# Patient Record
Sex: FEMALE | Race: WHITE | Employment: FULL TIME | URBAN - METROPOLITAN AREA
[De-identification: names, ages, dates, MRNs, and addresses within clinical notes are randomized per-mention and may not be internally consistent; named-entity substitution may affect disease eponyms.]

---

## 2018-09-24 PROBLEM — J30.2 SEASONAL ALLERGIES: Status: ACTIVE | Noted: 2018-09-24

## 2018-09-24 PROBLEM — F41.9 ANXIETY: Status: ACTIVE | Noted: 2018-09-24

## 2018-10-11 ENCOUNTER — LAB ENCOUNTER (OUTPATIENT)
Dept: LAB | Facility: REFERENCE LAB | Age: 28
End: 2018-10-11
Attending: OBSTETRICS & GYNECOLOGY
Payer: COMMERCIAL

## 2018-10-11 ENCOUNTER — OFFICE VISIT (OUTPATIENT)
Dept: OBGYN CLINIC | Facility: CLINIC | Age: 28
End: 2018-10-11
Payer: COMMERCIAL

## 2018-10-11 VITALS
DIASTOLIC BLOOD PRESSURE: 70 MMHG | BODY MASS INDEX: 30.4 KG/M2 | WEIGHT: 196 LBS | SYSTOLIC BLOOD PRESSURE: 112 MMHG | HEIGHT: 67.32 IN

## 2018-10-11 DIAGNOSIS — Z32.01 POSITIVE URINE PREGNANCY TEST: ICD-10-CM

## 2018-10-11 DIAGNOSIS — Z32.01 POSITIVE URINE PREGNANCY TEST: Primary | ICD-10-CM

## 2018-10-11 PROBLEM — Z90.49 HX LAPAROSCOPIC CHOLECYSTECTOMY: Status: ACTIVE | Noted: 2018-10-11

## 2018-10-11 PROBLEM — L50.3 DERMATOGRAPHIC URTICARIA: Status: ACTIVE | Noted: 2018-10-11

## 2018-10-11 PROCEDURE — 81025 URINE PREGNANCY TEST: CPT | Performed by: OBSTETRICS & GYNECOLOGY

## 2018-10-11 PROCEDURE — 87491 CHLMYD TRACH DNA AMP PROBE: CPT | Performed by: OBSTETRICS & GYNECOLOGY

## 2018-10-11 PROCEDURE — 84702 CHORIONIC GONADOTROPIN TEST: CPT | Performed by: OBSTETRICS & GYNECOLOGY

## 2018-10-11 PROCEDURE — 86780 TREPONEMA PALLIDUM: CPT | Performed by: OBSTETRICS & GYNECOLOGY

## 2018-10-11 PROCEDURE — 86850 RBC ANTIBODY SCREEN: CPT | Performed by: OBSTETRICS & GYNECOLOGY

## 2018-10-11 PROCEDURE — 87389 HIV-1 AG W/HIV-1&-2 AB AG IA: CPT | Performed by: OBSTETRICS & GYNECOLOGY

## 2018-10-11 PROCEDURE — 84144 ASSAY OF PROGESTERONE: CPT | Performed by: OBSTETRICS & GYNECOLOGY

## 2018-10-11 PROCEDURE — 87591 N.GONORRHOEAE DNA AMP PROB: CPT | Performed by: OBSTETRICS & GYNECOLOGY

## 2018-10-11 PROCEDURE — 86900 BLOOD TYPING SEROLOGIC ABO: CPT | Performed by: OBSTETRICS & GYNECOLOGY

## 2018-10-11 PROCEDURE — 99203 OFFICE O/P NEW LOW 30 MIN: CPT | Performed by: OBSTETRICS & GYNECOLOGY

## 2018-10-11 PROCEDURE — 85025 COMPLETE CBC W/AUTO DIFF WBC: CPT | Performed by: OBSTETRICS & GYNECOLOGY

## 2018-10-11 PROCEDURE — 36415 COLL VENOUS BLD VENIPUNCTURE: CPT | Performed by: OBSTETRICS & GYNECOLOGY

## 2018-10-11 PROCEDURE — 86901 BLOOD TYPING SEROLOGIC RH(D): CPT | Performed by: OBSTETRICS & GYNECOLOGY

## 2018-10-11 PROCEDURE — 87340 HEPATITIS B SURFACE AG IA: CPT | Performed by: OBSTETRICS & GYNECOLOGY

## 2018-10-11 PROCEDURE — 87086 URINE CULTURE/COLONY COUNT: CPT | Performed by: OBSTETRICS & GYNECOLOGY

## 2018-10-11 PROCEDURE — 86762 RUBELLA ANTIBODY: CPT | Performed by: OBSTETRICS & GYNECOLOGY

## 2018-10-11 NOTE — PROGRESS NOTES
Fe Page here for a checkup. This is her first visit to our office. She is 44-year-old [place] female  1 para 0. Last menstrual period was 2018. Her pregnancy test x2 at home were positive. Pregnancy test here is positive. .  Her lety normal in size. Shape: Normal.  Adnexa: No masses or tenderness. Cul de sac: Normal.  R/V: Not performed. No hemorrhoids. ASSESSMENT/PLAN:   Assessment     1. Positive urine pregnancy test         ASSESSMENT:      Early intrauterine pregnancy.

## 2018-10-12 ENCOUNTER — TELEPHONE (OUTPATIENT)
Dept: OBGYN CLINIC | Facility: CLINIC | Age: 28
End: 2018-10-12

## 2018-10-12 NOTE — TELEPHONE ENCOUNTER
Regarding: Test Results Question  Contact: [phone]  ----- Message from Generic, Mychart sent at 10/11/2018 10:57 PM CDT -----    Richard Keating. I just saw my blood test result.  Regarding progesterone levels, should I start taking progestorone medica

## 2018-10-24 ENCOUNTER — APPOINTMENT (OUTPATIENT)
Dept: LAB | Facility: REFERENCE LAB | Age: 28
End: 2018-10-24
Attending: OBSTETRICS & GYNECOLOGY
Payer: COMMERCIAL

## 2018-10-24 ENCOUNTER — OFFICE VISIT (OUTPATIENT)
Dept: OBGYN CLINIC | Facility: CLINIC | Age: 28
End: 2018-10-24
Payer: COMMERCIAL

## 2018-10-24 VITALS — WEIGHT: 194 LBS | BODY MASS INDEX: 30 KG/M2 | DIASTOLIC BLOOD PRESSURE: 66 MMHG | SYSTOLIC BLOOD PRESSURE: 112 MMHG

## 2018-10-24 DIAGNOSIS — Z32.01 POSITIVE BLOOD PREGNANCY TEST: Primary | ICD-10-CM

## 2018-10-24 DIAGNOSIS — Z32.01 POSITIVE BLOOD PREGNANCY TEST: ICD-10-CM

## 2018-10-24 PROBLEM — R87.612 PAPANICOLAOU SMEAR OF CERVIX WITH LOW GRADE SQUAMOUS INTRAEPITHELIAL LESION (LGSIL): Status: ACTIVE | Noted: 2018-10-24

## 2018-10-24 PROCEDURE — 84702 CHORIONIC GONADOTROPIN TEST: CPT | Performed by: OBSTETRICS & GYNECOLOGY

## 2018-10-24 PROCEDURE — 84144 ASSAY OF PROGESTERONE: CPT | Performed by: OBSTETRICS & GYNECOLOGY

## 2018-10-24 PROCEDURE — 36415 COLL VENOUS BLD VENIPUNCTURE: CPT | Performed by: OBSTETRICS & GYNECOLOGY

## 2018-10-24 RX ORDER — ASCORBIC ACID, CHOLECALCIFEROL, .ALPHA.-TOCOPHEROL ACETATE, DL-, PYRIDOXINE HYDROCHLORIDE, FOLIC ACID, CYANOCOBALAMIN, BIOTIN, CALCIUM CARBONATE, FERROUS ASPARTO GLYCINATE, IRON, POTASSIUM IODIDE, MAGNESIUM OXIDE, DOCONEXENT AND LOWBUSH BLUEBERRY 60; 1000; 10; 26; 400; 13; 280; 80; 9; 9; 150; 25; 350; 25; 600 MG/1; [IU]/1; [IU]/1; MG/1; UG/1; UG/1; UG/1; MG/1; MG/1; MG/1; UG/1; MG/1; MG/1; MG/1; UG/1
1 CAPSULE, GELATIN COATED ORAL
Refills: 4 | COMMUNITY
Start: 2018-10-12 | End: 2019-09-25

## 2018-10-24 NOTE — PROGRESS NOTES
Greta Balbuena is here for a checkup. She is accompanied by her . She just came back from  trip. She is complaining of slight nausea and minimal headaches. Her quantitative beta-hCG at last visit was just in 400 range.   Plan is to repeat her qu

## 2018-10-25 ENCOUNTER — TELEPHONE (OUTPATIENT)
Dept: OBGYN CLINIC | Facility: CLINIC | Age: 28
End: 2018-10-25

## 2018-10-25 ENCOUNTER — PATIENT MESSAGE (OUTPATIENT)
Dept: OBGYN CLINIC | Facility: CLINIC | Age: 28
End: 2018-10-25

## 2018-10-25 NOTE — TELEPHONE ENCOUNTER
US orders placed per Dr. Richelle Recio conversation with pt today and Princess Serrano will schedule US and appmnt with Dr. Marija Ponce in 1-2 weeks

## 2018-10-25 NOTE — TELEPHONE ENCOUNTER
I called Lenoard Leyden to let her know that her quantitative beta-hCG as well as serum progesterone was normal.  I recommended that she schedules an ultrasound at our office within a week or 2 and sees me following the ultrasound.

## 2018-10-25 NOTE — TELEPHONE ENCOUNTER
From: Tasneem Mondragon  To: Jenni Miller MD  Sent: 10/25/2018 12:29 PM CDT  Subject: Visit [facility] Dr Inez Hernadez,    I called another clinic [facility] and [address] [place] [address] Drive.  [address] [place] [address]

## 2018-10-25 NOTE — TELEPHONE ENCOUNTER
Per Jillian Citlali, pt unable to come to OP for US and scheduled it at [facility] hospital

## 2018-11-01 ENCOUNTER — HOSPITAL ENCOUNTER (OUTPATIENT)
Dept: ULTRASOUND IMAGING | Age: 28
Discharge: HOME | End: 2018-11-01
Attending: OBSTETRICS & GYNECOLOGY
Payer: COMMERCIAL

## 2018-11-01 ENCOUNTER — TELEPHONE (OUTPATIENT)
Dept: OBGYN CLINIC | Facility: CLINIC | Age: 28
End: 2018-11-01

## 2018-11-01 ENCOUNTER — PATIENT MESSAGE (OUTPATIENT)
Dept: OBGYN CLINIC | Facility: CLINIC | Age: 28
End: 2018-11-01

## 2018-11-01 DIAGNOSIS — Z32.01 ENCOUNTER FOR PREGNANCY TEST, RESULT POSITIVE: ICD-10-CM

## 2018-11-01 PROCEDURE — 76801 OB US < 14 WKS SINGLE FETUS: CPT | Performed by: OBSTETRICS & GYNECOLOGY

## 2018-11-01 PROCEDURE — 76817 TRANSVAGINAL US OBSTETRIC: CPT | Performed by: OBSTETRICS & GYNECOLOGY

## 2018-11-01 RX ORDER — ONDANSETRON 4 MG/1
4 TABLET, FILM COATED ORAL EVERY 8 HOURS PRN
Qty: 20 TABLET | Refills: 0 | Status: SHIPPED | OUTPATIENT
Start: 2018-11-01 | End: 2018-11-07

## 2018-11-01 NOTE — TELEPHONE ENCOUNTER
Called pt r/t to [facility]. Pt states she has tried smaller meals and crackers but is still vomiting about 4 times/day. Pt has tried ginger ale as well. Advised pt to stay hydrated by taking small sips of water but frequently about q 15 min.  Informed pt

## 2018-11-01 NOTE — TELEPHONE ENCOUNTER
From: Alhaji Tovar  To: Mercy Wing MD  Sent: 11/1/2018 7:54 AM CDT  Subject: Visit [facility] Dr Samuel Georgis,    I have been vomiting after each meal since last Friday approximately.  I had morning (all day sickness) . having nause

## 2018-11-05 ENCOUNTER — TELEPHONE (OUTPATIENT)
Dept: OBGYN CLINIC | Facility: CLINIC | Age: 28
End: 2018-11-05

## 2018-11-05 NOTE — TELEPHONE ENCOUNTER
Per pt, she states that she has not had a BM for about 5 days. She has been walking and drinking plenty of fluids throughout the day. Encouraged pt to try MOM and to let us know tomorrow if she still has not had a BM.  Pt is also inquiring about her progest

## 2018-11-06 ENCOUNTER — TELEPHONE (OUTPATIENT)
Dept: OBGYN CLINIC | Facility: CLINIC | Age: 28
End: 2018-11-06

## 2018-11-06 NOTE — TELEPHONE ENCOUNTER
Spoke to pt and she stated that she had a BM yesterday and feels much better. Encouraged her to continue increasing her fluids and will discuss other options with Dr. Bing Munguia and f/u with pt.     Per Dr. Bing Munguia, pt can take Senekot, 1 tab nightly and to in

## 2018-11-07 RX ORDER — ONDANSETRON 4 MG/1
4 TABLET, FILM COATED ORAL EVERY 8 HOURS PRN
Qty: 20 TABLET | Refills: 0 | Status: SHIPPED | OUTPATIENT
Start: 2018-11-07 | End: 2019-04-11

## 2018-11-14 ENCOUNTER — OFFICE VISIT (OUTPATIENT)
Dept: OBGYN CLINIC | Facility: CLINIC | Age: 28
End: 2018-11-14
Payer: COMMERCIAL

## 2018-11-14 VITALS — DIASTOLIC BLOOD PRESSURE: 64 MMHG | WEIGHT: 192 LBS | SYSTOLIC BLOOD PRESSURE: 112 MMHG | BODY MASS INDEX: 30 KG/M2

## 2018-11-14 DIAGNOSIS — Z32.01 PREGNANCY EXAMINATION OR TEST, POSITIVE RESULT: Primary | ICD-10-CM

## 2018-11-14 NOTE — PROGRESS NOTES
Richi Carlson here for a checkup. By her last menstrual period and ultrasound she is about 9 weeks and 5 days. Plan on her is to get ultrasound in our office in 2 weeks and see her following the ultrasound.   I discussed her options and getting prelude genetic scr

## 2018-12-03 ENCOUNTER — TELEPHONE (OUTPATIENT)
Dept: OBGYN CLINIC | Facility: CLINIC | Age: 28
End: 2018-12-03

## 2018-12-03 NOTE — TELEPHONE ENCOUNTER
Call rec'd from [facility] re: 90 day supply of Progesterone. Informed pharmacist that pt will likely only require the script for 2 more weeks. Pharmacy will disregard.

## 2018-12-04 DIAGNOSIS — Z32.01 PREGNANCY EXAMINATION OR TEST, POSITIVE RESULT: Primary | ICD-10-CM

## 2018-12-05 ENCOUNTER — APPOINTMENT (OUTPATIENT)
Dept: LAB | Facility: REFERENCE LAB | Age: 28
End: 2018-12-05
Attending: OBSTETRICS & GYNECOLOGY
Payer: COMMERCIAL

## 2018-12-05 ENCOUNTER — ULTRASOUND ENCOUNTER (OUTPATIENT)
Dept: OBGYN CLINIC | Facility: CLINIC | Age: 28
End: 2018-12-05
Payer: COMMERCIAL

## 2018-12-05 ENCOUNTER — ROUTINE PRENATAL (OUTPATIENT)
Dept: OBGYN CLINIC | Facility: CLINIC | Age: 28
End: 2018-12-05
Payer: COMMERCIAL

## 2018-12-05 VITALS — WEIGHT: 194 LBS | SYSTOLIC BLOOD PRESSURE: 110 MMHG | BODY MASS INDEX: 30 KG/M2 | DIASTOLIC BLOOD PRESSURE: 62 MMHG

## 2018-12-05 DIAGNOSIS — Z32.01 PREGNANCY EXAMINATION OR TEST, POSITIVE RESULT: ICD-10-CM

## 2018-12-05 DIAGNOSIS — Z34.90 ENCOUNTER FOR SUPERVISION OF NORMAL PREGNANCY, ANTEPARTUM, UNSPECIFIED GRAVIDITY: ICD-10-CM

## 2018-12-05 DIAGNOSIS — Z34.90 ENCOUNTER FOR SUPERVISION OF NORMAL PREGNANCY, ANTEPARTUM, UNSPECIFIED GRAVIDITY: Primary | ICD-10-CM

## 2018-12-05 PROCEDURE — 76801 OB US < 14 WKS SINGLE FETUS: CPT | Performed by: OBSTETRICS & GYNECOLOGY

## 2018-12-05 PROCEDURE — 82947 ASSAY GLUCOSE BLOOD QUANT: CPT

## 2018-12-05 PROCEDURE — 36415 COLL VENOUS BLD VENIPUNCTURE: CPT

## 2018-12-05 PROCEDURE — 83036 HEMOGLOBIN GLYCOSYLATED A1C: CPT | Performed by: OBSTETRICS & GYNECOLOGY

## 2018-12-06 NOTE — PROGRESS NOTES
Jeramy Manjarrez is here for a checkup. Her ultrasound today shows live intrauterine gestation at 12 weeks and 4 days. Patient says that for past couple weeks she has been feeling extremely thirsty. There is no history of diabetes in the family.   I am going to get

## 2018-12-12 ENCOUNTER — PATIENT MESSAGE (OUTPATIENT)
Dept: OBGYN CLINIC | Facility: CLINIC | Age: 28
End: 2018-12-12

## 2018-12-12 ENCOUNTER — TELEPHONE (OUTPATIENT)
Dept: OBGYN CLINIC | Facility: CLINIC | Age: 28
End: 2018-12-12

## 2018-12-12 NOTE — TELEPHONE ENCOUNTER
I called Lita Kindra let her know that her prelude genetic screen was negative for trisomy 24, 25, 15. Patient wanted to know the gender of the fetus. Fetus is male.

## 2018-12-19 ENCOUNTER — PATIENT MESSAGE (OUTPATIENT)
Dept: OBGYN CLINIC | Facility: CLINIC | Age: 28
End: 2018-12-19

## 2018-12-19 NOTE — TELEPHONE ENCOUNTER
Regarding: Other  Contact: [phone]  ----- Message from [employer] sent at 12/19/2018  3:11 PM CST -----    Richard Early,    Customer Service just called me saying that unfortunately you need to re-schedule the appointment of January 9th and

## 2019-01-09 ENCOUNTER — TELEPHONE (OUTPATIENT)
Dept: OBGYN CLINIC | Facility: CLINIC | Age: 29
End: 2019-01-09

## 2019-01-09 NOTE — TELEPHONE ENCOUNTER
Attempted to call pt back since she didn't call back with BP reading.  Phone first rang for a while then disconnected

## 2019-01-10 NOTE — TELEPHONE ENCOUNTER
Left another VM for 's office to call me r/t to pt's headaches    Spoke with Alma Nichols from [address] [place] [address] office. Alma Nichols will contact pt and get her in tomorrow morning if pt agrees to appmnt.  Pt aware she needs to be seen ASAP

## 2019-01-10 NOTE — TELEPHONE ENCOUNTER
Spoke with pt who states BP yesterday was 97/63. Pt has been drinking 2L of water per day and constant thirst has resolved. Pt has had daily headaches incl migraines x 1 months. Pt states she tries not to take Tylenol daily.  Pt has appmnt with aMrion bazan

## 2019-01-11 ENCOUNTER — OFFICE VISIT (OUTPATIENT)
Dept: INTERNAL MEDICINE CLINIC | Facility: CLINIC | Age: 29
End: 2019-01-11
Payer: COMMERCIAL

## 2019-01-11 VITALS
SYSTOLIC BLOOD PRESSURE: 118 MMHG | WEIGHT: 197 LBS | RESPIRATION RATE: 22 BRPM | BODY MASS INDEX: 31.66 KG/M2 | DIASTOLIC BLOOD PRESSURE: 70 MMHG | TEMPERATURE: 98 F | HEIGHT: 66 IN | HEART RATE: 104 BPM | OXYGEN SATURATION: 98 %

## 2019-01-11 DIAGNOSIS — Z3A.17 17 WEEKS GESTATION OF PREGNANCY: ICD-10-CM

## 2019-01-11 DIAGNOSIS — R51.9 HEADACHE DISORDER: Primary | ICD-10-CM

## 2019-01-11 PROCEDURE — 99203 OFFICE O/P NEW LOW 30 MIN: CPT | Performed by: INTERNAL MEDICINE

## 2019-01-11 NOTE — PROGRESS NOTES
HPI:    Patient ID: Domenica Renteria is a 33year old female. HPI  Mellissa Prince is pregnant , AOG 17 weeks. Known to have migraine headache. C/o frequent   headache in the last one month. Headache is localized along frontal area.  It was rated as 7 out of 10 Antyqb-MpIig-NxScx-Meth-FA-DHA (PRENATE MINI) 18-0.6-0.4-350 MG Oral Cap Take 1 capsule by mouth once daily.  Disp:  Rfl: 4     Allergies:  Lactose Intolerance     NAUSEA AND VOMITING, SWELLING   PHYSICAL EXAM:   Physical Exam   Nursing note and vitals re

## 2019-01-23 ENCOUNTER — ROUTINE PRENATAL (OUTPATIENT)
Dept: OBGYN CLINIC | Facility: CLINIC | Age: 29
End: 2019-01-23
Payer: COMMERCIAL

## 2019-01-23 VITALS — BODY MASS INDEX: 32 KG/M2 | DIASTOLIC BLOOD PRESSURE: 64 MMHG | WEIGHT: 200 LBS | SYSTOLIC BLOOD PRESSURE: 104 MMHG

## 2019-01-23 DIAGNOSIS — Z34.90 ENCOUNTER FOR SUPERVISION OF NORMAL PREGNANCY, ANTEPARTUM, UNSPECIFIED GRAVIDITY: Primary | ICD-10-CM

## 2019-01-24 ENCOUNTER — TELEPHONE (OUTPATIENT)
Dept: OBGYN CLINIC | Facility: CLINIC | Age: 29
End: 2019-01-24

## 2019-01-24 NOTE — TELEPHONE ENCOUNTER
Explained to [facility] in central scheduling that per order yes, cervical length need to be checked:    \"20 week scan and check cervical length\"    [facility] verbalized understanding.

## 2019-01-24 NOTE — PROGRESS NOTES
Bee prenatal check. She has no complaints. Patient says that her headaches got much better when she stopped taking progesterone. She has no complaints.   By size and dates she is about 20 weeks fetal heart tones were heard by Doppler end of normal.  Pa

## 2019-02-21 ENCOUNTER — ULTRASOUND ENCOUNTER (OUTPATIENT)
Dept: OBGYN CLINIC | Facility: CLINIC | Age: 29
End: 2019-02-21
Payer: COMMERCIAL

## 2019-02-21 ENCOUNTER — ROUTINE PRENATAL (OUTPATIENT)
Dept: OBGYN CLINIC | Facility: CLINIC | Age: 29
End: 2019-02-21
Payer: COMMERCIAL

## 2019-02-21 VITALS — DIASTOLIC BLOOD PRESSURE: 74 MMHG | WEIGHT: 204 LBS | SYSTOLIC BLOOD PRESSURE: 116 MMHG | BODY MASS INDEX: 33 KG/M2

## 2019-02-21 DIAGNOSIS — Z34.90 ENCOUNTER FOR SUPERVISION OF NORMAL PREGNANCY, ANTEPARTUM, UNSPECIFIED GRAVIDITY: ICD-10-CM

## 2019-02-21 DIAGNOSIS — Z34.90 ENCOUNTER FOR SUPERVISION OF NORMAL PREGNANCY, ANTEPARTUM, UNSPECIFIED GRAVIDITY: Primary | ICD-10-CM

## 2019-02-21 PROCEDURE — 76805 OB US >/= 14 WKS SNGL FETUS: CPT | Performed by: OBSTETRICS & GYNECOLOGY

## 2019-02-21 PROCEDURE — 76817 TRANSVAGINAL US OBSTETRIC: CPT | Performed by: OBSTETRICS & GYNECOLOGY

## 2019-02-22 NOTE — PROGRESS NOTES
Savannah Michelle here for prenatal check. She has no complaints. She reports active fetal movements. Her fetal anatomy ultrasound at our office today was completely normal including cervical length.   Her prenatal exam today was completely normal.

## 2019-03-12 ENCOUNTER — HOSPITAL ENCOUNTER (EMERGENCY)
Facility: HOSPITAL | Age: 29
Discharge: HOME | End: 2019-03-13
Attending: EMERGENCY MEDICINE
Payer: COMMERCIAL

## 2019-03-12 DIAGNOSIS — A08.4 VIRAL GASTROENTERITIS: Primary | ICD-10-CM

## 2019-03-12 PROCEDURE — 99284 EMERGENCY DEPT VISIT MOD MDM: CPT

## 2019-03-12 PROCEDURE — 96361 HYDRATE IV INFUSION ADD-ON: CPT

## 2019-03-12 PROCEDURE — 96374 THER/PROPH/DIAG INJ IV PUSH: CPT

## 2019-03-12 RX ORDER — METOCLOPRAMIDE HYDROCHLORIDE 5 MG/ML
10 INJECTION INTRAMUSCULAR; INTRAVENOUS ONCE
Status: COMPLETED | OUTPATIENT
Start: 2019-03-13 | End: 2019-03-13

## 2019-03-13 VITALS
TEMPERATURE: 98 F | HEART RATE: 92 BPM | SYSTOLIC BLOOD PRESSURE: 108 MMHG | BODY MASS INDEX: 31 KG/M2 | DIASTOLIC BLOOD PRESSURE: 61 MMHG | OXYGEN SATURATION: 95 % | RESPIRATION RATE: 16 BRPM | WEIGHT: 190 LBS

## 2019-03-13 LAB
ANION GAP SERPL CALC-SCNC: 8 MMOL/L (ref 0–18)
BASOPHILS # BLD AUTO: 0.02 X10(3) UL (ref 0–0.2)
BASOPHILS NFR BLD AUTO: 0.2 %
BUN BLD-MCNC: 8 MG/DL (ref 7–18)
BUN/CREAT SERPL: 17 (ref 10–20)
CALCIUM BLD-MCNC: 8.1 MG/DL (ref 8.5–10.1)
CHLORIDE SERPL-SCNC: 104 MMOL/L (ref 98–107)
CO2 SERPL-SCNC: 24 MMOL/L (ref 21–32)
CREAT BLD-MCNC: 0.47 MG/DL (ref 0.55–1.02)
DEPRECATED RDW RBC AUTO: 39.3 FL (ref 35.1–46.3)
EOSINOPHIL # BLD AUTO: 0.01 X10(3) UL (ref 0–0.7)
EOSINOPHIL NFR BLD AUTO: 0.1 %
ERYTHROCYTE [DISTWIDTH] IN BLOOD BY AUTOMATED COUNT: 12.6 % (ref 11–15)
GLUCOSE BLD-MCNC: 89 MG/DL (ref 70–99)
HCT VFR BLD AUTO: 34.3 % (ref 35–48)
HGB BLD-MCNC: 11.4 G/DL (ref 12–16)
IMM GRANULOCYTES # BLD AUTO: 0.07 X10(3) UL (ref 0–1)
IMM GRANULOCYTES NFR BLD: 0.6 %
LYMPHOCYTES # BLD AUTO: 0.78 X10(3) UL (ref 1–4)
LYMPHOCYTES NFR BLD AUTO: 6.9 %
MCH RBC QN AUTO: 28.7 PG (ref 26–34)
MCHC RBC AUTO-ENTMCNC: 33.2 G/DL (ref 31–37)
MCV RBC AUTO: 86.4 FL (ref 80–100)
MONOCYTES # BLD AUTO: 0.65 X10(3) UL (ref 0.1–1)
MONOCYTES NFR BLD AUTO: 5.7 %
NEUTROPHILS # BLD AUTO: 9.85 X10 (3) UL (ref 1.5–7.7)
NEUTROPHILS # BLD AUTO: 9.85 X10(3) UL (ref 1.5–7.7)
NEUTROPHILS NFR BLD AUTO: 86.5 %
OSMOLALITY SERPL CALC.SUM OF ELEC: 280 MOSM/KG (ref 275–295)
PLATELET # BLD AUTO: 233 10(3)UL (ref 150–450)
POTASSIUM SERPL-SCNC: 3.5 MMOL/L (ref 3.5–5.1)
RBC # BLD AUTO: 3.97 X10(6)UL (ref 3.8–5.3)
SODIUM SERPL-SCNC: 136 MMOL/L (ref 136–145)
WBC # BLD AUTO: 11.4 X10(3) UL (ref 4–11)

## 2019-03-13 PROCEDURE — 80048 BASIC METABOLIC PNL TOTAL CA: CPT | Performed by: EMERGENCY MEDICINE

## 2019-03-13 PROCEDURE — 85025 COMPLETE CBC W/AUTO DIFF WBC: CPT | Performed by: EMERGENCY MEDICINE

## 2019-03-13 RX ORDER — METOCLOPRAMIDE 10 MG/1
10 TABLET ORAL 3 TIMES DAILY PRN
Qty: 20 TABLET | Refills: 0 | Status: SHIPPED | OUTPATIENT
Start: 2019-03-13 | End: 2019-04-12

## 2019-03-13 NOTE — ED INITIAL ASSESSMENT (HPI)
Pt is 27wk , here for abdominal pain with fever and vomiting x1 day. Pt states that OB wanted her to come in to make sure she wasn't dehydrated. Pt took tylenol 30 minutes PTA. [facility] called, was told to keep pt in ER for evaluation.

## 2019-03-13 NOTE — ED NOTES
Starting noon abdominal pain. At 1800 abd pain continued and vomiting x 5. Fever of 101 at 2200. Took tylenol.

## 2019-03-13 NOTE — ED PROVIDER NOTES
Patient Seen in: [facility] AND [facility] Emergency Department    History   Patient presents with:  Abdomen/Flank Pain (GI/)  Fever (infectious)  Nausea/Vomiting/Diarrhea (gastrointestinal)    Stated Complaint: 27 wks preg/ abd pain; vomiting; fever    HPI exudates  Eyes: Conjunctivae and EOM are normal. PERRLA  Neck: Normal range of motion. Neck supple. No tracheal deviation present. CV: s1s2+, RRR, no m/r/g, normal distal pulses  Pulmonary/Chest: CTA b/l with no rales, wheezes.   No chest wall tenderness OB doctor.             Disposition and Plan     Clinical Impression:  Viral gastroenteritis  (primary encounter diagnosis)    Disposition:  Discharge  3/13/2019  1:20 am    Follow-up:  MD [address]

## 2019-03-13 NOTE — ED NOTES
Discharge instructions reviewed. Pt verbalized understanding with no further questions. Pain well controlled. Steady gait. Denies n/v. Scripts given to patient.

## 2019-03-15 ENCOUNTER — APPOINTMENT (OUTPATIENT)
Dept: LAB | Facility: HOSPITAL | Age: 29
End: 2019-03-15
Attending: OBSTETRICS & GYNECOLOGY
Payer: COMMERCIAL

## 2019-03-15 ENCOUNTER — ROUTINE PRENATAL (OUTPATIENT)
Dept: OBGYN CLINIC | Facility: CLINIC | Age: 29
End: 2019-03-15
Payer: COMMERCIAL

## 2019-03-15 VITALS — WEIGHT: 208 LBS | DIASTOLIC BLOOD PRESSURE: 70 MMHG | BODY MASS INDEX: 34 KG/M2 | SYSTOLIC BLOOD PRESSURE: 111 MMHG

## 2019-03-15 DIAGNOSIS — Z36.9 ENCOUNTER FOR ANTENATAL SCREENING OF MOTHER: Primary | ICD-10-CM

## 2019-03-15 DIAGNOSIS — Z34.90 ENCOUNTER FOR SUPERVISION OF NORMAL PREGNANCY, ANTEPARTUM, UNSPECIFIED GRAVIDITY: Primary | ICD-10-CM

## 2019-03-15 LAB — GLUCOSE 1H P GLC SERPL-MCNC: 84 MG/DL

## 2019-03-15 PROCEDURE — 82950 GLUCOSE TEST: CPT | Performed by: OBSTETRICS & GYNECOLOGY

## 2019-03-15 PROCEDURE — 36415 COLL VENOUS BLD VENIPUNCTURE: CPT | Performed by: OBSTETRICS & GYNECOLOGY

## 2019-03-15 NOTE — PROGRESS NOTES
Anika Hernandez is here for a checkup. She has no complaints. She reports active fetal movements. Patient was seen in the emergency room last week with a bout of gastroenteritis and was hydrated and started feeling better.   Her prenatal exam today was completely

## 2019-03-27 ENCOUNTER — ROUTINE PRENATAL (OUTPATIENT)
Dept: OBGYN CLINIC | Facility: CLINIC | Age: 29
End: 2019-03-27
Payer: COMMERCIAL

## 2019-03-27 VITALS — BODY MASS INDEX: 34 KG/M2 | WEIGHT: 209 LBS | DIASTOLIC BLOOD PRESSURE: 68 MMHG | SYSTOLIC BLOOD PRESSURE: 106 MMHG

## 2019-03-27 DIAGNOSIS — Z34.90 ENCOUNTER FOR SUPERVISION OF NORMAL PREGNANCY, ANTEPARTUM, UNSPECIFIED GRAVIDITY: Primary | ICD-10-CM

## 2019-03-27 PROCEDURE — 90715 TDAP VACCINE 7 YRS/> IM: CPT | Performed by: OBSTETRICS & GYNECOLOGY

## 2019-03-27 PROCEDURE — 90471 IMMUNIZATION ADMIN: CPT | Performed by: OBSTETRICS & GYNECOLOGY

## 2019-03-27 NOTE — PROGRESS NOTES
Haley Fears is here for prenatal check. She has no complaints. She reports active fetal movements. Diabetic screening was negative I discussed her hemoglobin and recommended that she takes Slow Fe 1 a day.   We discussed Tdap vaccination and patient will Viola Clayton

## 2019-04-03 ENCOUNTER — PATIENT MESSAGE (OUTPATIENT)
Dept: OBGYN CLINIC | Facility: CLINIC | Age: 29
End: 2019-04-03

## 2019-04-05 NOTE — TELEPHONE ENCOUNTER
From: Michelle Williamson  To: Hailey Levin MD  Sent: 4/3/2019 11:21 AM CDT  Subject: Non-Urgent Ruby Oleary,    Quick question regarding TDAP vaccine, does my  needs to get it as well before the baby is born?     thank y

## 2019-04-11 ENCOUNTER — ROUTINE PRENATAL (OUTPATIENT)
Dept: OBGYN CLINIC | Facility: CLINIC | Age: 29
End: 2019-04-11
Payer: COMMERCIAL

## 2019-04-11 VITALS
HEIGHT: 66 IN | SYSTOLIC BLOOD PRESSURE: 106 MMHG | WEIGHT: 211.69 LBS | BODY MASS INDEX: 34.02 KG/M2 | DIASTOLIC BLOOD PRESSURE: 70 MMHG

## 2019-04-11 DIAGNOSIS — Z36.9 ENCOUNTER FOR ANTENATAL SCREENING: ICD-10-CM

## 2019-04-11 DIAGNOSIS — O26.893 PELVIC PAIN AFFECTING PREGNANCY IN THIRD TRIMESTER, ANTEPARTUM: ICD-10-CM

## 2019-04-11 DIAGNOSIS — Z32.01 PREGNANCY EXAMINATION OR TEST, POSITIVE RESULT: Primary | ICD-10-CM

## 2019-04-11 DIAGNOSIS — R10.2 PELVIC PAIN AFFECTING PREGNANCY IN THIRD TRIMESTER, ANTEPARTUM: ICD-10-CM

## 2019-04-11 RX ORDER — MELATONIN
325
COMMUNITY
End: 2019-09-25

## 2019-04-11 RX ORDER — BREAST PUMP
EACH MISCELLANEOUS
Qty: 1 EACH | Refills: 0 | Status: SHIPPED | OUTPATIENT
Start: 2019-04-11 | End: 2019-09-25

## 2019-04-11 NOTE — PROGRESS NOTES
+ c/o sharp pubic bone pain when she rolls over in bed or puts pressure on one leg. She feels like it is getting worse. She tried using belly band without relief. Referred to PT. Plans epidural for delivery.

## 2019-04-14 ENCOUNTER — HOSPITAL ENCOUNTER (OUTPATIENT)
Facility: HOSPITAL | Age: 29
Setting detail: OBSERVATION
Discharge: HOME | End: 2019-04-15
Attending: OBSTETRICS & GYNECOLOGY | Admitting: OBSTETRICS & GYNECOLOGY
Payer: COMMERCIAL

## 2019-04-14 VITALS — TEMPERATURE: 98 F | RESPIRATION RATE: 16 BRPM | HEIGHT: 65.98 IN | WEIGHT: 211.69 LBS | BODY MASS INDEX: 34.02 KG/M2

## 2019-04-14 PROBLEM — Z34.90 PREGNANCY (HCC): Status: ACTIVE | Noted: 2019-04-14

## 2019-04-14 PROBLEM — Z34.90 PREGNANCY: Status: ACTIVE | Noted: 2019-04-14

## 2019-04-15 PROCEDURE — 99212 OFFICE O/P EST SF 10 MIN: CPT

## 2019-04-15 NOTE — TRIAGE
[facility] HOSP - [facility]      Triage Note    Gutierrez [place] Patient Status:  Observation    1990 MRN [id number]   Location [address] [facility] Attending Shyam Acevedo MD   Hosp Day # 0 PCP MD Federico Costa

## 2019-04-15 NOTE — PROGRESS NOTES
Pt is a 30year old female admitted to TR1/TR1-A. Patient presents with:  Decreased Fetal Movement: Pt states she has felt decreased fetal movement since this morning around 10am.      Pt is  31w1d intra-uterine pregnancy.   History obtained, orien

## 2019-04-15 NOTE — TRIAGE
[facility]      Triage Note    Don Garcia Patient Status:  Observation    1990 MRN [id number]   Location [address] [facility] Attending Puneet Serrano MD   Hosp Day # 0 PCP MD Jane Yi

## 2019-04-24 ENCOUNTER — TELEPHONE (OUTPATIENT)
Dept: OBGYN CLINIC | Facility: CLINIC | Age: 29
End: 2019-04-24

## 2019-04-24 NOTE — TELEPHONE ENCOUNTER
Order written for pts breast pump and signed by Dr. Priya Mustafa. Pt to p/u tomorrow at the office as she has a f/u appt with Dr. Makenzie Lorenzo. Gave to [facility].

## 2019-04-25 ENCOUNTER — ROUTINE PRENATAL (OUTPATIENT)
Dept: OBGYN CLINIC | Facility: CLINIC | Age: 29
End: 2019-04-25
Payer: COMMERCIAL

## 2019-04-25 VITALS
HEIGHT: 66 IN | WEIGHT: 214.69 LBS | BODY MASS INDEX: 34.5 KG/M2 | DIASTOLIC BLOOD PRESSURE: 70 MMHG | SYSTOLIC BLOOD PRESSURE: 100 MMHG

## 2019-04-25 DIAGNOSIS — Z3A.38 38 WEEKS GESTATION OF PREGNANCY: Primary | ICD-10-CM

## 2019-05-10 ENCOUNTER — ROUTINE PRENATAL (OUTPATIENT)
Dept: OBGYN CLINIC | Facility: CLINIC | Age: 29
End: 2019-05-10
Payer: COMMERCIAL

## 2019-05-10 VITALS — BODY MASS INDEX: 35 KG/M2 | WEIGHT: 215 LBS | SYSTOLIC BLOOD PRESSURE: 116 MMHG | DIASTOLIC BLOOD PRESSURE: 68 MMHG

## 2019-05-10 DIAGNOSIS — Z34.90 ENCOUNTER FOR SUPERVISION OF NORMAL PREGNANCY, ANTEPARTUM, UNSPECIFIED GRAVIDITY: Primary | ICD-10-CM

## 2019-05-10 NOTE — PROGRESS NOTES
Richelle Fowler is here for prenatal check. She has no complaints. She reports active fetal movements. Her exam today was completely normal.  I discussed upcoming beta strep culture with her. Also discussed was labor and delivery.   Patient knows the gender of th

## 2019-05-15 ENCOUNTER — APPOINTMENT (OUTPATIENT)
Dept: LAB | Facility: REFERENCE LAB | Age: 29
End: 2019-05-15
Attending: OBSTETRICS & GYNECOLOGY
Payer: COMMERCIAL

## 2019-05-15 ENCOUNTER — ROUTINE PRENATAL (OUTPATIENT)
Dept: OBGYN CLINIC | Facility: CLINIC | Age: 29
End: 2019-05-15
Payer: COMMERCIAL

## 2019-05-15 VITALS — DIASTOLIC BLOOD PRESSURE: 72 MMHG | BODY MASS INDEX: 35 KG/M2 | SYSTOLIC BLOOD PRESSURE: 116 MMHG | WEIGHT: 216 LBS

## 2019-05-15 DIAGNOSIS — Z34.90 ENCOUNTER FOR SUPERVISION OF NORMAL PREGNANCY, ANTEPARTUM, UNSPECIFIED GRAVIDITY: Primary | ICD-10-CM

## 2019-05-15 PROCEDURE — 85025 COMPLETE CBC W/AUTO DIFF WBC: CPT | Performed by: OBSTETRICS & GYNECOLOGY

## 2019-05-15 PROCEDURE — 86780 TREPONEMA PALLIDUM: CPT | Performed by: OBSTETRICS & GYNECOLOGY

## 2019-05-15 PROCEDURE — 87389 HIV-1 AG W/HIV-1&-2 AB AG IA: CPT | Performed by: OBSTETRICS & GYNECOLOGY

## 2019-05-15 PROCEDURE — 36415 COLL VENOUS BLD VENIPUNCTURE: CPT | Performed by: OBSTETRICS & GYNECOLOGY

## 2019-05-15 NOTE — PROGRESS NOTES
La Nena Rascon for prenatal check. She has no complaints. She reports active fetal movements. Her prenatal exam today was completely normal.  I obtained a beta strep culture from vagina and rectum. Reviewed labor and delivery instructions again.

## 2019-05-22 ENCOUNTER — ROUTINE PRENATAL (OUTPATIENT)
Dept: OBGYN CLINIC | Facility: CLINIC | Age: 29
End: 2019-05-22
Payer: COMMERCIAL

## 2019-05-22 VITALS — BODY MASS INDEX: 35 KG/M2 | SYSTOLIC BLOOD PRESSURE: 106 MMHG | DIASTOLIC BLOOD PRESSURE: 70 MMHG | WEIGHT: 219 LBS

## 2019-05-22 DIAGNOSIS — Z34.90 ENCOUNTER FOR SUPERVISION OF NORMAL PREGNANCY, ANTEPARTUM, UNSPECIFIED GRAVIDITY: Primary | ICD-10-CM

## 2019-05-22 NOTE — PROGRESS NOTES
Grant Horne for a checkup. She has no complaints she reports active fetal movements. She has gained 3 pounds in 1 weeks I recommended that she starts taking bedrest on the left lateral side as much as possible.   Discussed symptoms of preeclampsia with her head

## 2019-05-31 ENCOUNTER — ROUTINE PRENATAL (OUTPATIENT)
Dept: OBGYN CLINIC | Facility: CLINIC | Age: 29
End: 2019-05-31
Payer: COMMERCIAL

## 2019-05-31 VITALS — DIASTOLIC BLOOD PRESSURE: 64 MMHG | WEIGHT: 218 LBS | SYSTOLIC BLOOD PRESSURE: 106 MMHG | BODY MASS INDEX: 35 KG/M2

## 2019-05-31 DIAGNOSIS — Z34.90 ENCOUNTER FOR SUPERVISION OF NORMAL PREGNANCY, ANTEPARTUM, UNSPECIFIED GRAVIDITY: Primary | ICD-10-CM

## 2019-05-31 NOTE — PROGRESS NOTES
Jeramy Manjarrez is here for prenatal check. She has no complaints. Patient has lost 1 pound and is feeling much better. She reports active fetal movements.   Her prenatal exam today was completely normal.

## 2019-06-04 ENCOUNTER — ROUTINE PRENATAL (OUTPATIENT)
Dept: OBGYN CLINIC | Facility: CLINIC | Age: 29
End: 2019-06-04
Payer: COMMERCIAL

## 2019-06-04 VITALS
DIASTOLIC BLOOD PRESSURE: 62 MMHG | SYSTOLIC BLOOD PRESSURE: 104 MMHG | WEIGHT: 218.13 LBS | HEIGHT: 66 IN | BODY MASS INDEX: 35.06 KG/M2

## 2019-06-04 DIAGNOSIS — Z34.90 ENCOUNTER FOR SUPERVISION OF NORMAL PREGNANCY, ANTEPARTUM, UNSPECIFIED GRAVIDITY: Primary | ICD-10-CM

## 2019-06-04 NOTE — PROGRESS NOTES
Richelle Fowler is here for prenatal check. She has no complaints. She reports active fetal movements. Her prenatal exam today was completely normal.  Again discussed was fetal kick counts, spontaneous rupture of membranes, vaginal bleeding with her.

## 2019-06-11 ENCOUNTER — ROUTINE PRENATAL (OUTPATIENT)
Dept: OBGYN CLINIC | Facility: CLINIC | Age: 29
End: 2019-06-11
Payer: COMMERCIAL

## 2019-06-11 VITALS — WEIGHT: 219 LBS | SYSTOLIC BLOOD PRESSURE: 110 MMHG | BODY MASS INDEX: 35 KG/M2 | DIASTOLIC BLOOD PRESSURE: 60 MMHG

## 2019-06-11 DIAGNOSIS — Z34.90 ENCOUNTER FOR SUPERVISION OF NORMAL PREGNANCY, ANTEPARTUM, UNSPECIFIED GRAVIDITY: Primary | ICD-10-CM

## 2019-06-11 NOTE — PROGRESS NOTES
Ashly Lam for prenatal check. She has no complaints. Her prenatal exam today was completely normal.  Plan on her is to see her on Monday following BPP ultrasound.

## 2019-06-13 ENCOUNTER — HOSPITAL ENCOUNTER (INPATIENT)
Facility: HOSPITAL | Age: 29
LOS: 3 days | Discharge: HOME | End: 2019-06-16
Attending: OBSTETRICS & GYNECOLOGY | Admitting: OBSTETRICS & GYNECOLOGY
Payer: COMMERCIAL

## 2019-06-13 PROBLEM — O47.9 UTERINE CONTRACTIONS DURING PREGNANCY (HCC): Status: ACTIVE | Noted: 2019-06-13

## 2019-06-13 PROBLEM — O47.9 UTERINE CONTRACTIONS DURING PREGNANCY: Status: ACTIVE | Noted: 2019-06-13

## 2019-06-13 PROBLEM — O26.93 PREGNANCY RELATED CONDITION IN THIRD TRIMESTER (HCC): Status: ACTIVE | Noted: 2019-06-13

## 2019-06-13 PROBLEM — O26.93 PREGNANCY RELATED CONDITION IN THIRD TRIMESTER: Status: ACTIVE | Noted: 2019-06-13

## 2019-06-13 RX ORDER — LIDOCAINE HYDROCHLORIDE 10 MG/ML
30 INJECTION, SOLUTION EPIDURAL; INFILTRATION; INTRACAUDAL; PERINEURAL ONCE
Status: DISCONTINUED | OUTPATIENT
Start: 2019-06-13 | End: 2019-06-14 | Stop reason: HOSPADM

## 2019-06-13 RX ORDER — IBUPROFEN 600 MG/1
600 TABLET ORAL ONCE AS NEEDED
Status: DISCONTINUED | OUTPATIENT
Start: 2019-06-13 | End: 2019-06-14 | Stop reason: HOSPADM

## 2019-06-13 RX ORDER — LIDOCAINE HYDROCHLORIDE AND EPINEPHRINE 20; 5 MG/ML; UG/ML
20 INJECTION, SOLUTION EPIDURAL; INFILTRATION; INTRACAUDAL; PERINEURAL ONCE
Status: DISCONTINUED | OUTPATIENT
Start: 2019-06-13 | End: 2019-06-16

## 2019-06-13 RX ORDER — TERBUTALINE SULFATE 1 MG/ML
0.25 INJECTION, SOLUTION SUBCUTANEOUS AS NEEDED
Status: DISCONTINUED | OUTPATIENT
Start: 2019-06-13 | End: 2019-06-14 | Stop reason: HOSPADM

## 2019-06-13 RX ORDER — TRISODIUM CITRATE DIHYDRATE AND CITRIC ACID MONOHYDRATE 500; 334 MG/5ML; MG/5ML
30 SOLUTION ORAL AS NEEDED
Status: DISCONTINUED | OUTPATIENT
Start: 2019-06-13 | End: 2019-06-14 | Stop reason: HOSPADM

## 2019-06-13 RX ORDER — AMMONIA INHALANTS 0.04 G/.3ML
0.3 INHALANT RESPIRATORY (INHALATION) AS NEEDED
Status: DISCONTINUED | OUTPATIENT
Start: 2019-06-13 | End: 2019-06-14 | Stop reason: HOSPADM

## 2019-06-13 RX ORDER — BUPIVACAINE HYDROCHLORIDE 2.5 MG/ML
30 INJECTION, SOLUTION EPIDURAL; INFILTRATION; INTRACAUDAL ONCE
Status: DISCONTINUED | OUTPATIENT
Start: 2019-06-13 | End: 2019-06-16

## 2019-06-13 RX ORDER — EPHEDRINE SULFATE/0.9% NACL/PF 25 MG/5 ML
5 SYRINGE (ML) INTRAVENOUS AS NEEDED
Status: DISCONTINUED | OUTPATIENT
Start: 2019-06-13 | End: 2019-06-16

## 2019-06-13 RX ORDER — NALBUPHINE HCL 10 MG/ML
2.5 AMPUL (ML) INJECTION
Status: DISCONTINUED | OUTPATIENT
Start: 2019-06-13 | End: 2019-06-16

## 2019-06-13 RX ORDER — SODIUM CHLORIDE 0.9 % (FLUSH) 0.9 %
10 SYRINGE (ML) INJECTION AS NEEDED
Status: DISCONTINUED | OUTPATIENT
Start: 2019-06-13 | End: 2019-06-14 | Stop reason: HOSPADM

## 2019-06-13 RX ORDER — DEXTROSE, SODIUM CHLORIDE, SODIUM LACTATE, POTASSIUM CHLORIDE, AND CALCIUM CHLORIDE 5; .6; .31; .03; .02 G/100ML; G/100ML; G/100ML; G/100ML; G/100ML
INJECTION, SOLUTION INTRAVENOUS CONTINUOUS
Status: DISCONTINUED | OUTPATIENT
Start: 2019-06-13 | End: 2019-06-14 | Stop reason: HOSPADM

## 2019-06-13 NOTE — PROGRESS NOTES
BL 's minimal to moderate variability PO fluids given. No active vaginal bleeding. Pt states good fetal movement.

## 2019-06-13 NOTE — PROGRESS NOTES
Pt is a 30year old female admitted to TR2/TR2-A. Patient presents with:  Vaginal Bleeding: started some vaginal spotting and some cramping after 3 am today  SROM     Pt is  39w5d intra-uterine pregnancy. History obtained, consents signed.  Sabra Adair

## 2019-06-13 NOTE — PROGRESS NOTES
's moderate variability. Amnisure positive. Dr Javi Chow notify. Pt to be admitted to L&D. Report to [facility] given.

## 2019-06-13 NOTE — H&P
GYN H&P     2019  7:42 AM    CC: Patient is here for painful contractions and leakage of fluid. HPI: Patient is a 30year old  at 44 5/7 W presents with painful contractions and leakage of fluid since 3:30 AM today.      Her pregnancy has bee or nodes, no nipple discharge, no skin changes, no dippling, no palpable axillary adenopathy  ABDOMEN:  Gravid, nontender, nondistended, EFW = 3400 gms      A/P: Patient is 30year old female  at 44 W admitted with active labor and leakage of fluid - a

## 2019-06-14 ENCOUNTER — ANESTHESIA EVENT (OUTPATIENT)
Dept: OBGYN UNIT | Facility: HOSPITAL | Age: 29
End: 2019-06-14
Payer: COMMERCIAL

## 2019-06-14 ENCOUNTER — ANESTHESIA (OUTPATIENT)
Dept: OBGYN UNIT | Facility: HOSPITAL | Age: 29
End: 2019-06-14
Payer: COMMERCIAL

## 2019-06-14 PROBLEM — Z32.01 PREGNANCY EXAMINATION OR TEST, POSITIVE RESULT: Status: RESOLVED | Noted: 2018-10-11 | Resolved: 2019-06-14

## 2019-06-14 PROBLEM — O47.9 UTERINE CONTRACTIONS DURING PREGNANCY (HCC): Status: RESOLVED | Noted: 2019-06-13 | Resolved: 2019-06-14

## 2019-06-14 PROBLEM — Z32.01 PREGNANCY EXAMINATION OR TEST, POSITIVE RESULT (HCC): Status: RESOLVED | Noted: 2018-10-11 | Resolved: 2019-06-14

## 2019-06-14 PROBLEM — O26.93 PREGNANCY RELATED CONDITION IN THIRD TRIMESTER (HCC): Status: RESOLVED | Noted: 2019-06-13 | Resolved: 2019-06-14

## 2019-06-14 PROBLEM — O47.9 UTERINE CONTRACTIONS DURING PREGNANCY: Status: RESOLVED | Noted: 2019-06-13 | Resolved: 2019-06-14

## 2019-06-14 PROBLEM — O26.93 PREGNANCY RELATED CONDITION IN THIRD TRIMESTER: Status: RESOLVED | Noted: 2019-06-13 | Resolved: 2019-06-14

## 2019-06-14 PROBLEM — Z34.90 PREGNANCY: Status: RESOLVED | Noted: 2019-04-14 | Resolved: 2019-06-14

## 2019-06-14 PROBLEM — Z34.90 PREGNANCY (HCC): Status: RESOLVED | Noted: 2019-04-14 | Resolved: 2019-06-14

## 2019-06-14 PROCEDURE — 10H07YZ INSERTION OF OTHER DEVICE INTO PRODUCTS OF CONCEPTION, VIA NATURAL OR ARTIFICIAL OPENING: ICD-10-PCS | Performed by: OBSTETRICS & GYNECOLOGY

## 2019-06-14 PROCEDURE — 0KQM0ZZ REPAIR PERINEUM MUSCLE, OPEN APPROACH: ICD-10-PCS | Performed by: OBSTETRICS & GYNECOLOGY

## 2019-06-14 PROCEDURE — 0UQMXZZ REPAIR VULVA, EXTERNAL APPROACH: ICD-10-PCS | Performed by: OBSTETRICS & GYNECOLOGY

## 2019-06-14 PROCEDURE — 4A1H74Z MONITORING OF PRODUCTS OF CONCEPTION, CARDIAC ELECTRICAL ACTIVITY, VIA NATURAL OR ARTIFICIAL OPENING: ICD-10-PCS | Performed by: OBSTETRICS & GYNECOLOGY

## 2019-06-14 PROCEDURE — 59400 OBSTETRICAL CARE: CPT | Performed by: OBSTETRICS & GYNECOLOGY

## 2019-06-14 RX ORDER — SODIUM CHLORIDE 0.9 % (FLUSH) 0.9 %
10 SYRINGE (ML) INJECTION AS NEEDED
Status: DISCONTINUED | OUTPATIENT
Start: 2019-06-14 | End: 2019-06-16

## 2019-06-14 RX ORDER — DOCUSATE SODIUM 100 MG/1
100 CAPSULE, LIQUID FILLED ORAL 2 TIMES DAILY
Status: DISCONTINUED | OUTPATIENT
Start: 2019-06-14 | End: 2019-06-16

## 2019-06-14 RX ORDER — DIAPER,BRIEF,INFANT-TODD,DISP
1 EACH MISCELLANEOUS EVERY 6 HOURS PRN
Status: DISCONTINUED | OUTPATIENT
Start: 2019-06-14 | End: 2019-06-16

## 2019-06-14 RX ORDER — MISOPROSTOL 200 UG/1
TABLET ORAL
Status: DISPENSED
Start: 2019-06-14 | End: 2019-06-15

## 2019-06-14 RX ORDER — METHYLERGONOVINE MALEATE 0.2 MG/ML
INJECTION INTRAVENOUS
Status: DISCONTINUED
Start: 2019-06-14 | End: 2019-06-14 | Stop reason: WASHOUT

## 2019-06-14 RX ORDER — LIDOCAINE HYDROCHLORIDE 10 MG/ML
INJECTION, SOLUTION EPIDURAL; INFILTRATION; INTRACAUDAL; PERINEURAL AS NEEDED
Status: DISCONTINUED | OUTPATIENT
Start: 2019-06-14 | End: 2019-06-14 | Stop reason: SURG

## 2019-06-14 RX ORDER — ACETAMINOPHEN 325 MG/1
650 TABLET ORAL EVERY 6 HOURS PRN
Status: DISCONTINUED | OUTPATIENT
Start: 2019-06-14 | End: 2019-06-16

## 2019-06-14 RX ORDER — CHOLECALCIFEROL (VITAMIN D3) 25 MCG
1 TABLET,CHEWABLE ORAL DAILY
Status: DISCONTINUED | OUTPATIENT
Start: 2019-06-14 | End: 2019-06-16

## 2019-06-14 RX ORDER — SIMETHICONE 80 MG
80 TABLET,CHEWABLE ORAL 3 TIMES DAILY PRN
Status: DISCONTINUED | OUTPATIENT
Start: 2019-06-14 | End: 2019-06-16

## 2019-06-14 RX ORDER — CARBOPROST TROMETHAMINE 250 UG/ML
INJECTION, SOLUTION INTRAMUSCULAR
Status: DISCONTINUED
Start: 2019-06-14 | End: 2019-06-14 | Stop reason: WASHOUT

## 2019-06-14 RX ORDER — ONDANSETRON 2 MG/ML
4 INJECTION INTRAMUSCULAR; INTRAVENOUS EVERY 6 HOURS PRN
Status: DISCONTINUED | OUTPATIENT
Start: 2019-06-14 | End: 2019-06-16

## 2019-06-14 RX ORDER — AMMONIA INHALANTS 0.04 G/.3ML
0.3 INHALANT RESPIRATORY (INHALATION) AS NEEDED
Status: DISCONTINUED | OUTPATIENT
Start: 2019-06-14 | End: 2019-06-16

## 2019-06-14 RX ORDER — BISACODYL 10 MG
10 SUPPOSITORY, RECTAL RECTAL ONCE AS NEEDED
Status: DISCONTINUED | OUTPATIENT
Start: 2019-06-14 | End: 2019-06-16

## 2019-06-14 RX ORDER — LIDOCAINE HYDROCHLORIDE AND EPINEPHRINE 15; 5 MG/ML; UG/ML
INJECTION, SOLUTION EPIDURAL AS NEEDED
Status: DISCONTINUED | OUTPATIENT
Start: 2019-06-14 | End: 2019-06-14 | Stop reason: SURG

## 2019-06-14 RX ORDER — BUPIVACAINE HYDROCHLORIDE 2.5 MG/ML
INJECTION, SOLUTION EPIDURAL; INFILTRATION; INTRACAUDAL AS NEEDED
Status: DISCONTINUED | OUTPATIENT
Start: 2019-06-14 | End: 2019-06-14 | Stop reason: SURG

## 2019-06-14 RX ORDER — IBUPROFEN 600 MG/1
600 TABLET ORAL EVERY 6 HOURS
Status: DISCONTINUED | OUTPATIENT
Start: 2019-06-14 | End: 2019-06-16

## 2019-06-14 RX ADMIN — LIDOCAINE HYDROCHLORIDE 3 ML: 10 INJECTION, SOLUTION EPIDURAL; INFILTRATION; INTRACAUDAL; PERINEURAL at 01:42:00

## 2019-06-14 RX ADMIN — BUPIVACAINE HYDROCHLORIDE 2 MG: 2.5 INJECTION, SOLUTION EPIDURAL; INFILTRATION; INTRACAUDAL at 01:47:00

## 2019-06-14 RX ADMIN — LIDOCAINE HYDROCHLORIDE AND EPINEPHRINE 3 ML: 15; 5 INJECTION, SOLUTION EPIDURAL at 01:48:00

## 2019-06-14 NOTE — ANESTHESIA POSTPROCEDURE EVALUATION
Patient: Evelia Velasco    Procedure Summary     Date:  06/14/19 Room / Location:      Anesthesia Start:  [facility] Anesthesia Stop:  1249    Procedure:  LABOR ANALGESIA Diagnosis:      Scheduled Providers:   Anesthesiologist:  MD Evangelina Andrew

## 2019-06-14 NOTE — PROGRESS NOTES
Patient up to bathroom with assist x 2. Voided 750. Patient transferred to mother/baby room 354 per wheelchair in stable condition with baby and personal belongings. Accompanied by significant other and staff. Report given to Jazzy Elena mother/baby RN.

## 2019-06-14 NOTE — PROGRESS NOTES
Contractions every 3 to 4 minutes. Her IV was infiltrated so IV had to be restarted in the right antecubital space. Fetal heart tones are in category 1.     Sterile vaginal exam shows cervix to be 90% effaced 7 to 8 cm dilated, head is at -1 station with

## 2019-06-14 NOTE — ANESTHESIA PROCEDURE NOTES
Labor Analgesia  Performed by: Latesha Joyner DO  Authorized by: Latesha Joyner DO     Patient Location:  OB  Start Time:  6/14/2019 1:34 AM  End Time:  6/14/2019 1:47 AM  Reason for Block: labor epidural    Anesthesiologist:  Latesha Joyner DO  Performed

## 2019-06-14 NOTE — PROGRESS NOTES
IV in right wrist was infiltrated. IV restarted in [address] [place] [address].  Pit restarted at 6 per [facility] request.

## 2019-06-14 NOTE — H&P
Esha Dunlap 95 Patient Status:  Inpatient    1990 MRN [id number]   Location [address]  Attending Puneet Serrano MD   [facility] Day [facility] PCP Lay Luz MD     Subjective:  Geeta Lakhani

## 2019-06-14 NOTE — PROGRESS NOTES
Patient was admitted yesterday with history of spontaneous rupture of membranes. She was started on Pitocin augmentation of labor. Admission cervix was posterior 60% effaced about 2 cm dilated, spontaneous rupture of membrane was documented by [facility].

## 2019-06-14 NOTE — PROGRESS NOTES
Patient progressed well in labor. She had normal spontaneous vaginal delivery of male infant, Apgars 8 and 9, weight 3910 g, head position was left occiput anterior, there was thick meconium stained amniotic fluid following delivery.     Placenta was deliv

## 2019-06-14 NOTE — ANESTHESIA PREPROCEDURE EVALUATION
Anesthesia PreOp Note    HPI:     An Fontenot is a 30year old female who presents for preoperative consultation requested by: * No surgeons listed *    Date of Surgery: 6/14/2019    * No procedures listed *  Indication: * No pre-op diagnosis enter PRN Liban Oms, DO 2 mg at 06/14/19 0147    fentaNYL citrate (SUBLIMAZE) 0.05 MG/ML injection  Intrathecal PRN Liban Oms, DO 15 mcg at 06/14/19 0147    Lidocaine HCl (PF) (XYLOCAINE) 1 % injection SOLN  Injection PRN Liban Oms, DO 3 mL at 06/14/ Once Fiordaliza Strauss MD       No current [facility]-ordered outpatient medications on file.       Lactose Intolerance     NAUSEA AND VOMITING, SWELLING    Family History   Problem Relation Age of Onset   • Other (Other) Mother         thyroid tumor file      Available pre-op labs reviewed.   Lab Results   Component Value Date    WBC 11.3 (H) 06/13/2019    RBC 4.99 06/13/2019    HGB 13.0 06/13/2019    HCT 40.7 06/13/2019    MCV 81.6 06/13/2019    MCH 26.1 06/13/2019    MCHC 31.9 06/13/2019    RDW 15.4 questions were answered to the best of my ability. The patient desires the anesthetic management as planned.   Chriss Major  6/14/2019 2:45 AM

## 2019-06-15 NOTE — LACTATION NOTE
This note was copied from a baby's chart.   LACTATION NOTE - INFANT    Evaluation Type  Evaluation Type: Inpatient    Problems & Assessment  Problems Diagnosed or Identified: Sleepy  Infant Assessment: Good skin turgor;Hunger cues present;Skin color: pink o

## 2019-06-15 NOTE — LACTATION NOTE
LACTATION NOTE - MOTHER      Evaluation Type: Inpatient    Problems identified  Problems identified: Knowledge deficit              Maternal Assessment  Bilateral Breasts: Soft; Wide spaced  Bilateral Nipples: Everted  Prior breastfeeding experience (commen

## 2019-06-15 NOTE — PROGRESS NOTES
Postpartum day 1    Temperature is normal, vital signs are stable, lochia is normal, hemoglobin is stable. Patient is ambulating. Complaining of minimal pain in the stitches.     On examination: Abdomen is soft without any tenderness, uterus is firm wit

## 2019-06-15 NOTE — LACTATION NOTE
This note was copied from a baby's chart.   Mother of baby states she feels she is not producing colostrum and feels infant is not getting anything at breast, she was pumping and collecting drops states she is no longer pumping, infant has been receiving fo

## 2019-06-15 NOTE — PLAN OF CARE
Problem: Patient Centered Care  Goal: Patient preferences are identified and integrated in the patient's plan of care  Description  Interventions:  - What would you like us to know as we care for you?   - Provide timely, complete, and accurate informatio breast  Description  INTERVENTIONS:  - Initiate breast feeding within first hour after birth. - Monitor effectiveness of current breast feeding efforts.   - Assess support systems available to mother/family.  - Identify cultural beliefs/practices regardin measures. Outcome: Progressing  Goal: Appropriate maternal -  bonding  Description  INTERVENTIONS:  - Assess caregiver- interactions. - Assess caregiver's emotional status and coping mechanisms.   - Encourage caregiver to participate in newb

## 2019-06-15 NOTE — PROGRESS NOTES
Admission Summary     Patient was received in room 354 via wheelchair. Bedside report received from [place], [address] [place] [address]. Patient assisted to bed with standby assist. Vitals and assessment WNL. Fundus firm, lochia small.  Bed in locked and lowest position and call li

## 2019-06-15 NOTE — PLAN OF CARE
Problem: Patient Centered Care  Goal: Patient preferences are identified and integrated in the patient's plan of care  Description  Interventions:  - What would you like us to know as we care for you?  - Provide timely, complete, and accurate information MD/LIP if interventions unsuccessful or patient reports new pain  - Anticipate increased pain with activity and pre-medicate as appropriate  Outcome: Progressing     Problem: ANXIETY  Goal: Will report anxiety at manageable levels  Description  INTERVENTIO Discuss/demonstrate breast feeding aids (e.g., infant sling, nursing footstool/pillows, and breast pumps). - Encourage mother/other family members to express feelings/concerns, and actively listen.   - Educate father/SO about benefits of breast feeding and tolerated. Interacting appropriately with infant. Will continue plan of care.

## 2019-06-16 VITALS
SYSTOLIC BLOOD PRESSURE: 115 MMHG | OXYGEN SATURATION: 100 % | RESPIRATION RATE: 18 BRPM | WEIGHT: 218.94 LBS | HEART RATE: 75 BPM | TEMPERATURE: 98 F | BODY MASS INDEX: 35.19 KG/M2 | DIASTOLIC BLOOD PRESSURE: 68 MMHG | HEIGHT: 65.98 IN

## 2019-06-16 NOTE — PROGRESS NOTES
Postpartum day 2    Temperature normal, vital signs are stable. Lochia is normal.    Patient does not want baby circumcised. Would like to go home today. Impression: Normal postpartum    Plan: Home today.   Instructed to call if temperature is above 10

## 2019-06-16 NOTE — DISCHARGE SUMMARY
[facility] - [facility]    Discharge Summary    Jeremiah Burgos Patient Status:  Inpatient    1990 MRN [id number]   Location [facility] 3SE Attending Kiran Cruz MD   [facility] Day # 3 PCP Sade Wu MD     Date of Admission:

## 2019-06-18 ENCOUNTER — NURSE ONLY (OUTPATIENT)
Dept: LACTATION | Facility: HOSPITAL | Age: 29
End: 2019-06-18
Attending: OBSTETRICS & GYNECOLOGY
Payer: COMMERCIAL

## 2019-06-18 PROCEDURE — 99213 OFFICE O/P EST LOW 20 MIN: CPT

## 2019-06-18 NOTE — PATIENT INSTRUCTIONS
Infant Discharge Feeding Plan -      Please consider attending the Nurturing Mom support group for new mothers on Wednesdays at 12:15 (see on handout) and expect a call from Jose Bahena, [address] [place] [address] Counselor  Highly recommended infant be ? Use cross cradle and laid back positions. ? Deep Latching on:  ? Express drops of milk onto your baby’s lips to encourage licking. ? Point your nipple to baby’s nose  ? Stroke nipple lightly down center of lips if needed  ?  Wait for wide mouth with t Remember the helpful website to improve your production that helps [email or web address]/MaxProduction. html .      Breastfeeding Journal:  Write down your baby’s feedings and diapers – if not meeting the guideline for number of diapers ? Call you baby’s doctor with questions as well. Weight check within week, sooner if wet or stool diapers decrease.  Should gain about 1 oz per day (minimum 5-7 oz per week)     For additional information: [employer]    Encouraged use of [place] ? Set pressure gauge to minimum and turn switch on. Increase the suction to the most suction that is comfortable for you. Remember pumping should never be painful.   ? If breast milk flow is minimal, try increasing pressure gauge if it is comfortable to do Include night feedings and/or pumping sessions. Your hormone levels are higher at night. Increasing milk flow to baby if breastfeeding:   Improve your baby’s position and latch. Positioning:   ? Your hand at neck/shoulders, not the back of head. ?  Flory Perez

## 2019-06-18 NOTE — PROGRESS NOTES
LACTATION NOTE - MOTHER      Evaluation Type: Outpatient Initial    Problems identified  Problems identified: Knowledge deficit    Maternal history  Maternal history: Anxiety  Other/comment: history of cholecystectomy; patient reports prior diagnosis of PC (P) Filling, red/small blisters/bruises, mild/mod discomfort  Hold (Positioning): (P) Full assist, teach one side, mother does other, staff holds    Pre/Post Weights  Pre-Weight Right Breast (g): (P) 3670  Post-Weight Right Breast (g): (P) 3682  ml of milk attempted to breastfeed since her discharge, she had been using the cradle hold and not able to get infant to latch.  Today I taught, encouraged her to use and assisted her with using breast massage, hand expression , skin to skin and using the cross cradle slow flow nipple & hold infant upright and provide the feeding slowly over 15-20 minutes. Encouraged follow up weight check in 2 days.  Expected weight gain of 5-7 ounces/week    Discussed and encouraged her to read about galactogogues and consider u

## 2019-06-21 ENCOUNTER — PATIENT MESSAGE (OUTPATIENT)
Dept: OBGYN CLINIC | Facility: CLINIC | Age: 29
End: 2019-06-21

## 2019-06-21 NOTE — PROGRESS NOTES
Pt had a  2019 attended by [place] with a 2nd degree laceration that was repaired.   Pt states yesterday she started to have more cramping, rating her pain as a 6/10 and last night she felt like her perineum was more swollen then normal.  Pt denies heav

## 2019-06-21 NOTE — TELEPHONE ENCOUNTER
From: Yazan Ragsdale  To: Danie Ibarra MD  Sent: 6/21/2019 12:09 AM CDT  Subject: Other    Hello Dr Jazmin Liz experiencing strong cramping in front and back and also increase in swelling in the genital area that hurts. .. is it normal symptom

## 2019-07-02 ENCOUNTER — POSTPARTUM (OUTPATIENT)
Dept: OBGYN CLINIC | Facility: CLINIC | Age: 29
End: 2019-07-02
Payer: COMMERCIAL

## 2019-07-02 VITALS
WEIGHT: 195 LBS | HEIGHT: 66 IN | SYSTOLIC BLOOD PRESSURE: 116 MMHG | BODY MASS INDEX: 31.34 KG/M2 | DIASTOLIC BLOOD PRESSURE: 74 MMHG

## 2019-07-02 PROBLEM — N81.11 PELVIC RELAXATION DUE TO CYSTOCELE, MIDLINE: Status: ACTIVE | Noted: 2019-07-02

## 2019-07-02 NOTE — PROGRESS NOTES
Angela Krishnan is here for a checkup. Patient had a normal spontaneous vaginal delivery June 14, 2019. Baby weighed 8 pound 10 ounce. Patient had second-degree midline vaginal tear which was sutured with 3-0 chromic and 2-0 chromic suture.     Patient is [place]

## 2019-07-23 ENCOUNTER — POSTPARTUM (OUTPATIENT)
Dept: OBGYN CLINIC | Facility: CLINIC | Age: 29
End: 2019-07-23
Payer: COMMERCIAL

## 2019-07-23 VITALS
BODY MASS INDEX: 31.5 KG/M2 | DIASTOLIC BLOOD PRESSURE: 68 MMHG | WEIGHT: 196 LBS | SYSTOLIC BLOOD PRESSURE: 114 MMHG | HEIGHT: 66 IN

## 2019-07-23 NOTE — PROGRESS NOTES
Greta Balbuena is here for a checkup. On pelvic examination patient's pelvic floor strength is much better. Symptomatically patient says that she feels the same.   Plan is to have her perform more Keagle exercises and I would like to reevaluate her again in 6 we

## 2019-08-28 ENCOUNTER — PATIENT MESSAGE (OUTPATIENT)
Dept: OBGYN CLINIC | Facility: CLINIC | Age: 29
End: 2019-08-28

## 2019-08-28 NOTE — TELEPHONE ENCOUNTER
From: Hang Matson  To: Angel Tarango MD  Sent: 8/28/2019 10:31 AM CDT  Subject: Non-Urgent Medical Question    Hi Dr. Óscar Beckwith,    When should I schedule my follow up visit?  I wanted to see how things are going with my progression post-partum mojgan

## 2019-08-28 NOTE — PROGRESS NOTES
Pt called, pt states she has a history of depression that was helped by taking Lexapro. Pt denies any feelings of SI and she is still able to work but her anxiety is getting worse.   Pt encouraged to call her PCP to give her an RX for Lexapro so she can co

## 2019-09-25 ENCOUNTER — OFFICE VISIT (OUTPATIENT)
Dept: OBGYN CLINIC | Facility: CLINIC | Age: 29
End: 2019-09-25
Payer: COMMERCIAL

## 2019-09-25 VITALS
BODY MASS INDEX: 31.02 KG/M2 | WEIGHT: 193 LBS | HEIGHT: 66 IN | DIASTOLIC BLOOD PRESSURE: 74 MMHG | SYSTOLIC BLOOD PRESSURE: 122 MMHG

## 2019-09-25 DIAGNOSIS — Z30.430 ENCOUNTER FOR IUD INSERTION: Primary | ICD-10-CM

## 2019-09-25 PROBLEM — Z97.5 IUD (INTRAUTERINE DEVICE) IN PLACE: Status: ACTIVE | Noted: 2019-09-25

## 2019-09-25 LAB
CONTROL LINE PRESENT WITH A CLEAR BACKGROUND (YES/NO): YES YES/NO
KIT LOT #: NORMAL NUMERIC
PREGNANCY TEST, URINE: NEGATIVE

## 2019-09-25 PROCEDURE — 88175 CYTOPATH C/V AUTO FLUID REDO: CPT | Performed by: OBSTETRICS & GYNECOLOGY

## 2019-09-25 PROCEDURE — 87624 HPV HI-RISK TYP POOLED RSLT: CPT | Performed by: OBSTETRICS & GYNECOLOGY

## 2019-09-25 PROCEDURE — 81025 URINE PREGNANCY TEST: CPT | Performed by: OBSTETRICS & GYNECOLOGY

## 2019-09-25 PROCEDURE — 58300 INSERT INTRAUTERINE DEVICE: CPT | Performed by: OBSTETRICS & GYNECOLOGY

## 2019-09-25 NOTE — PROGRESS NOTES
Nilsa Aldanaaps here for Mirena IUD insertion. Also she is due for a Pap smear. I discussed the procedure of IUD insertion. Discussed Mirena IUD risks, benefits, alternatives were discussed. Informed consent was signed.     [facility]

## 2019-09-26 LAB — HPV I/H RISK 1 DNA SPEC QL NAA+PROBE: NEGATIVE

## 2019-10-01 NOTE — PROGRESS NOTES
Released to [employer] and results viewed by pt. 6 Month pap reminder placed in office tickler file.

## 2020-01-13 ENCOUNTER — OFFICE VISIT (OUTPATIENT)
Dept: INTERNAL MEDICINE CLINIC | Facility: CLINIC | Age: 30
End: 2020-01-13
Payer: COMMERCIAL

## 2020-01-13 VITALS
DIASTOLIC BLOOD PRESSURE: 70 MMHG | HEART RATE: 92 BPM | SYSTOLIC BLOOD PRESSURE: 124 MMHG | WEIGHT: 200 LBS | RESPIRATION RATE: 19 BRPM | OXYGEN SATURATION: 98 % | BODY MASS INDEX: 32.14 KG/M2 | HEIGHT: 66 IN

## 2020-01-13 DIAGNOSIS — J10.1 URI DUE TO INFLUENZA A VIRUS: ICD-10-CM

## 2020-01-13 DIAGNOSIS — J20.9 BRONCHITIS, ACUTE, WITH BRONCHOSPASM: ICD-10-CM

## 2020-01-13 DIAGNOSIS — J01.10 ACUTE NON-RECURRENT FRONTAL SINUSITIS: Primary | ICD-10-CM

## 2020-01-13 PROCEDURE — 99213 OFFICE O/P EST LOW 20 MIN: CPT | Performed by: INTERNAL MEDICINE

## 2020-01-13 RX ORDER — AMOXICILLIN AND CLAVULANATE POTASSIUM 875; 125 MG/1; MG/1
1 TABLET, FILM COATED ORAL 2 TIMES DAILY
Qty: 14 TABLET | Refills: 0 | Status: SHIPPED | OUTPATIENT
Start: 2020-01-13

## 2020-01-13 RX ORDER — GUAIFENESIN 600 MG
600 TABLET, EXTENDED RELEASE 12 HR ORAL 2 TIMES DAILY
Qty: 20 TABLET | Refills: 0 | Status: SHIPPED | OUTPATIENT
Start: 2020-01-13

## 2020-01-13 RX ORDER — FLUTICASONE PROPIONATE 50 MCG
2 SPRAY, SUSPENSION (ML) NASAL DAILY
Qty: 1 BOTTLE | Refills: 1 | Status: SHIPPED | OUTPATIENT
Start: 2020-01-13 | End: 2020-02-04

## 2020-01-13 RX ORDER — ALBUTEROL SULFATE 90 UG/1
2 AEROSOL, METERED RESPIRATORY (INHALATION) EVERY 6 HOURS PRN
Qty: 1 INHALER | Refills: 1 | Status: SHIPPED | OUTPATIENT
Start: 2020-01-13

## 2020-01-13 NOTE — PROGRESS NOTES
HPI:    Patient ID: Domenica Renteria is a 30year old female. HPI     Patient was seen at the [facility] center yesterday. She had  for fever of 102 for 4 days with  body aches chills and sore throat.   She was diagnosed to have influenza and star reviewed. Constitutional: No distress. Coughs frequently    HENT:   Left Ear: Tympanic membrane normal.   Nasal mucosal swelling ,frontal sinus tenderness  postnasal drip with yellow thick phlegm. Eyes: Pupils are equal, round, and reactive to light.

## 2020-02-04 RX ORDER — FLUTICASONE PROPIONATE 50 MCG
SPRAY, SUSPENSION (ML) NASAL
Qty: 1 INHALER | Refills: 1 | Status: SHIPPED | OUTPATIENT
Start: 2020-02-04

## 2020-03-03 ENCOUNTER — TELEPHONE (OUTPATIENT)
Dept: OBGYN CLINIC | Facility: CLINIC | Age: 30
End: 2020-03-03

## 2020-06-10 ENCOUNTER — TELEPHONE (OUTPATIENT)
Dept: OBGYN CLINIC | Facility: CLINIC | Age: 30
End: 2020-06-10

## 2020-06-10 NOTE — TELEPHONE ENCOUNTER
Called pt's contact # unable to leave message [employer] message sent to pt. Per office tickler file from 09/2019 pt was due for 6 month repeat pap in (03/2020).

## 2021-04-12 ENCOUNTER — LAB ENCOUNTER (OUTPATIENT)
Dept: LAB | Facility: HOSPITAL | Age: 31
End: 2021-04-12
Attending: INTERNAL MEDICINE
Payer: COMMERCIAL

## 2021-04-12 DIAGNOSIS — E28.2 PCOS (POLYCYSTIC OVARIAN SYNDROME): Primary | ICD-10-CM

## 2021-04-12 DIAGNOSIS — L70.0 ACNE VULGARIS: ICD-10-CM

## 2021-04-12 DIAGNOSIS — E28.2 POLYCYSTIC OVARIAN SYNDROME: ICD-10-CM

## 2021-04-12 DIAGNOSIS — E88.81 INSULIN RESISTANCE: ICD-10-CM

## 2021-04-12 PROCEDURE — 82627 DEHYDROEPIANDROSTERONE: CPT

## 2021-04-12 PROCEDURE — 84402 ASSAY OF FREE TESTOSTERONE: CPT

## 2021-04-12 PROCEDURE — 36415 COLL VENOUS BLD VENIPUNCTURE: CPT

## 2021-04-12 PROCEDURE — 80053 COMPREHEN METABOLIC PANEL: CPT

## 2021-04-12 PROCEDURE — 83036 HEMOGLOBIN GLYCOSYLATED A1C: CPT

## 2021-04-12 PROCEDURE — 84403 ASSAY OF TOTAL TESTOSTERONE: CPT

## 2021-04-12 PROCEDURE — 85025 COMPLETE CBC W/AUTO DIFF WBC: CPT

## 2022-12-29 PROCEDURE — 99284 EMERGENCY DEPT VISIT MOD MDM: CPT

## 2022-12-30 ENCOUNTER — HOSPITAL ENCOUNTER (EMERGENCY)
Facility: HOSPITAL | Age: 32
Discharge: HOME | End: 2022-12-30
Payer: COMMERCIAL

## 2022-12-30 VITALS
HEART RATE: 71 BPM | OXYGEN SATURATION: 98 % | RESPIRATION RATE: 18 BRPM | DIASTOLIC BLOOD PRESSURE: 74 MMHG | SYSTOLIC BLOOD PRESSURE: 101 MMHG | TEMPERATURE: 98 F

## 2022-12-30 DIAGNOSIS — R11.2 NAUSEA AND VOMITING IN ADULT: Primary | ICD-10-CM

## 2022-12-30 LAB
ALBUMIN SERPL-MCNC: 3.6 G/DL (ref 3.4–5)
ALBUMIN/GLOB SERPL: 1.1 {RATIO} (ref 1–2)
ALP LIVER SERPL-CCNC: 51 U/L
ALT SERPL-CCNC: 26 U/L
ANION GAP SERPL CALC-SCNC: 12 MMOL/L (ref 0–18)
AST SERPL-CCNC: 12 U/L (ref 15–37)
BASOPHILS # BLD AUTO: 0.01 X10(3) UL (ref 0–0.2)
BASOPHILS NFR BLD AUTO: 0.1 %
BILIRUB SERPL-MCNC: 0.7 MG/DL (ref 0.1–2)
BUN BLD-MCNC: 20 MG/DL (ref 7–18)
BUN/CREAT SERPL: 32.8 (ref 10–20)
CALCIUM BLD-MCNC: 8 MG/DL (ref 8.5–10.1)
CHLORIDE SERPL-SCNC: 106 MMOL/L (ref 98–112)
CO2 SERPL-SCNC: 23 MMOL/L (ref 21–32)
CREAT BLD-MCNC: 0.61 MG/DL
DEPRECATED RDW RBC AUTO: 39.9 FL (ref 35.1–46.3)
EOSINOPHIL # BLD AUTO: 0.01 X10(3) UL (ref 0–0.7)
EOSINOPHIL NFR BLD AUTO: 0.1 %
ERYTHROCYTE [DISTWIDTH] IN BLOOD BY AUTOMATED COUNT: 12.6 % (ref 11–15)
GFR SERPLBLD BASED ON 1.73 SQ M-ARVRAT: 123 ML/MIN/1.73M2 (ref 60–?)
GLOBULIN PLAS-MCNC: 3.3 G/DL (ref 2.8–4.4)
GLUCOSE BLD-MCNC: 105 MG/DL (ref 70–99)
HCT VFR BLD AUTO: 39.4 %
HGB BLD-MCNC: 13.3 G/DL
IMM GRANULOCYTES # BLD AUTO: 0.03 X10(3) UL (ref 0–1)
IMM GRANULOCYTES NFR BLD: 0.3 %
LIPASE SERPL-CCNC: 90 U/L (ref 73–393)
LYMPHOCYTES # BLD AUTO: 0.48 X10(3) UL (ref 1–4)
LYMPHOCYTES NFR BLD AUTO: 5.5 %
MCH RBC QN AUTO: 29.4 PG (ref 26–34)
MCHC RBC AUTO-ENTMCNC: 33.8 G/DL (ref 31–37)
MCV RBC AUTO: 87 FL
MONOCYTES # BLD AUTO: 0.37 X10(3) UL (ref 0.1–1)
MONOCYTES NFR BLD AUTO: 4.3 %
NEUTROPHILS # BLD AUTO: 7.78 X10 (3) UL (ref 1.5–7.7)
NEUTROPHILS # BLD AUTO: 7.78 X10(3) UL (ref 1.5–7.7)
NEUTROPHILS NFR BLD AUTO: 89.7 %
OSMOLALITY SERPL CALC.SUM OF ELEC: 295 MOSM/KG (ref 275–295)
PLATELET # BLD AUTO: 229 10(3)UL (ref 150–450)
POTASSIUM SERPL-SCNC: 3.3 MMOL/L (ref 3.5–5.1)
PROT SERPL-MCNC: 6.9 G/DL (ref 6.4–8.2)
RBC # BLD AUTO: 4.53 X10(6)UL
SODIUM SERPL-SCNC: 141 MMOL/L (ref 136–145)
WBC # BLD AUTO: 8.7 X10(3) UL (ref 4–11)

## 2022-12-30 PROCEDURE — 96374 THER/PROPH/DIAG INJ IV PUSH: CPT

## 2022-12-30 PROCEDURE — 80053 COMPREHEN METABOLIC PANEL: CPT | Performed by: NURSE PRACTITIONER

## 2022-12-30 PROCEDURE — 83690 ASSAY OF LIPASE: CPT | Performed by: NURSE PRACTITIONER

## 2022-12-30 PROCEDURE — 96361 HYDRATE IV INFUSION ADD-ON: CPT

## 2022-12-30 PROCEDURE — 85025 COMPLETE CBC W/AUTO DIFF WBC: CPT | Performed by: NURSE PRACTITIONER

## 2022-12-30 RX ORDER — ONDANSETRON 4 MG/1
4 TABLET, ORALLY DISINTEGRATING ORAL EVERY 4 HOURS PRN
Qty: 10 TABLET | Refills: 0 | Status: SHIPPED | OUTPATIENT
Start: 2022-12-30 | End: 2023-01-06

## 2022-12-30 RX ORDER — DICYCLOMINE HCL 20 MG
20 TABLET ORAL 4 TIMES DAILY PRN
Qty: 30 TABLET | Refills: 0 | Status: SHIPPED | OUTPATIENT
Start: 2022-12-30 | End: 2023-01-29

## 2022-12-30 RX ORDER — ONDANSETRON 2 MG/ML
4 INJECTION INTRAMUSCULAR; INTRAVENOUS ONCE
Status: COMPLETED | OUTPATIENT
Start: 2022-12-30 | End: 2022-12-30

## 2022-12-30 NOTE — ED QUICK NOTES
Patient is alert and oriented x4. Showing no sign or symptoms of any respiratory distress. Tolerating fluids well, no further nausea noted. Comfort measures in place.

## 2022-12-30 NOTE — ED INITIAL ASSESSMENT (HPI)
Pt AOx4 C/C vomiting x 4 hours approx every 30 minutes. Patient's son had similar experience earlier in the week. Denies constipation or diarrhea, headache, cough, fever.

## 2023-01-04 ENCOUNTER — TELEPHONE (OUTPATIENT)
Dept: INTERNAL MEDICINE CLINIC | Facility: CLINIC | Age: 33
End: 2023-01-04

## 2023-01-04 NOTE — TELEPHONE ENCOUNTER
Called patient to verify pcp to establish care. Former patient of Dr. Ramona Torres or if patient has a new pcp to please notify the office.      Unable to leave message

## 2024-10-21 ENCOUNTER — HOSPITAL ENCOUNTER (EMERGENCY)
Facility: HOSPITAL | Age: 34
Discharge: HOME | End: 2024-10-21
Attending: EMERGENCY MEDICINE
Payer: COMMERCIAL

## 2024-10-21 VITALS
SYSTOLIC BLOOD PRESSURE: 100 MMHG | WEIGHT: 155 LBS | TEMPERATURE: 98 F | HEIGHT: 66 IN | RESPIRATION RATE: 16 BRPM | OXYGEN SATURATION: 100 % | HEART RATE: 70 BPM | DIASTOLIC BLOOD PRESSURE: 63 MMHG | BODY MASS INDEX: 24.91 KG/M2

## 2024-10-21 DIAGNOSIS — J01.90 ACUTE SINUSITIS, RECURRENCE NOT SPECIFIED, UNSPECIFIED LOCATION: Primary | ICD-10-CM

## 2024-10-21 DIAGNOSIS — R51.9 NONINTRACTABLE HEADACHE, UNSPECIFIED CHRONICITY PATTERN, UNSPECIFIED HEADACHE TYPE: ICD-10-CM

## 2024-10-21 LAB
ANION GAP SERPL CALC-SCNC: 3 MMOL/L (ref 0–18)
B-HCG UR QL: NEGATIVE
BASOPHILS # BLD AUTO: 0.01 X10(3) UL (ref 0–0.2)
BASOPHILS NFR BLD AUTO: 0.5 %
BILIRUB UR QL: NEGATIVE
BUN BLD-MCNC: 8 MG/DL (ref 9–23)
BUN/CREAT SERPL: 11.8 (ref 10–20)
CALCIUM BLD-MCNC: 8.6 MG/DL (ref 8.7–10.4)
CHLORIDE SERPL-SCNC: 107 MMOL/L (ref 98–112)
CO2 SERPL-SCNC: 25 MMOL/L (ref 21–32)
COLOR UR: YELLOW
CREAT BLD-MCNC: 0.68 MG/DL
DEPRECATED RDW RBC AUTO: 40.9 FL (ref 35.1–46.3)
EGFRCR SERPLBLD CKD-EPI 2021: 118 ML/MIN/1.73M2 (ref 60–?)
EOSINOPHIL # BLD AUTO: 0 X10(3) UL (ref 0–0.7)
EOSINOPHIL NFR BLD AUTO: 0 %
ERYTHROCYTE [DISTWIDTH] IN BLOOD BY AUTOMATED COUNT: 12.6 % (ref 11–15)
FLUAV + FLUBV RNA SPEC NAA+PROBE: NEGATIVE
FLUAV + FLUBV RNA SPEC NAA+PROBE: NEGATIVE
GLUCOSE BLD-MCNC: 95 MG/DL (ref 70–99)
GLUCOSE UR-MCNC: NORMAL MG/DL
HCT VFR BLD AUTO: 40.8 %
HGB BLD-MCNC: 13.9 G/DL
IMM GRANULOCYTES # BLD AUTO: 0.02 X10(3) UL (ref 0–1)
IMM GRANULOCYTES NFR BLD: 1 %
LEUKOCYTE ESTERASE UR QL STRIP.AUTO: 25
LYMPHOCYTES # BLD AUTO: 0.51 X10(3) UL (ref 1–4)
LYMPHOCYTES NFR BLD AUTO: 25.2 %
MCH RBC QN AUTO: 30.2 PG (ref 26–34)
MCHC RBC AUTO-ENTMCNC: 34.1 G/DL (ref 31–37)
MCV RBC AUTO: 88.5 FL
MONOCYTES # BLD AUTO: 0.12 X10(3) UL (ref 0.1–1)
MONOCYTES NFR BLD AUTO: 5.9 %
NEUTROPHILS # BLD AUTO: 1.36 X10 (3) UL (ref 1.5–7.7)
NEUTROPHILS # BLD AUTO: 1.36 X10(3) UL (ref 1.5–7.7)
NEUTROPHILS NFR BLD AUTO: 67.4 %
NITRITE UR QL STRIP.AUTO: NEGATIVE
OSMOLALITY SERPL CALC.SUM OF ELEC: 278 MOSM/KG (ref 275–295)
PH UR: 6.5 [PH] (ref 5–8)
PLATELET # BLD AUTO: 121 10(3)UL (ref 150–450)
PLATELETS.RETICULATED NFR BLD AUTO: 4.8 % (ref 0–7)
POTASSIUM SERPL-SCNC: 3.7 MMOL/L (ref 3.5–5.1)
PROT UR-MCNC: 70 MG/DL
RBC # BLD AUTO: 4.61 X10(6)UL
RSV RNA SPEC NAA+PROBE: NEGATIVE
SARS-COV-2 RNA RESP QL NAA+PROBE: NOT DETECTED
SODIUM SERPL-SCNC: 135 MMOL/L (ref 136–145)
SP GR UR STRIP: >1.03 (ref 1–1.03)
UROBILINOGEN UR STRIP-ACNC: 2
WBC # BLD AUTO: 2 X10(3) UL (ref 4–11)

## 2024-10-21 PROCEDURE — 81001 URINALYSIS AUTO W/SCOPE: CPT | Performed by: EMERGENCY MEDICINE

## 2024-10-21 PROCEDURE — 0241U SARS-COV-2/FLU A AND B/RSV BY PCR (GENEXPERT): CPT | Performed by: EMERGENCY MEDICINE

## 2024-10-21 PROCEDURE — 80048 BASIC METABOLIC PNL TOTAL CA: CPT | Performed by: EMERGENCY MEDICINE

## 2024-10-21 PROCEDURE — 85060 BLOOD SMEAR INTERPRETATION: CPT | Performed by: EMERGENCY MEDICINE

## 2024-10-21 PROCEDURE — 96374 THER/PROPH/DIAG INJ IV PUSH: CPT

## 2024-10-21 PROCEDURE — 99284 EMERGENCY DEPT VISIT MOD MDM: CPT

## 2024-10-21 PROCEDURE — 96375 TX/PRO/DX INJ NEW DRUG ADDON: CPT

## 2024-10-21 PROCEDURE — 81025 URINE PREGNANCY TEST: CPT

## 2024-10-21 PROCEDURE — 85025 COMPLETE CBC W/AUTO DIFF WBC: CPT | Performed by: EMERGENCY MEDICINE

## 2024-10-21 PROCEDURE — 87086 URINE CULTURE/COLONY COUNT: CPT | Performed by: EMERGENCY MEDICINE

## 2024-10-21 PROCEDURE — 96361 HYDRATE IV INFUSION ADD-ON: CPT

## 2024-10-21 RX ORDER — METOCLOPRAMIDE HYDROCHLORIDE 5 MG/ML
10 INJECTION INTRAMUSCULAR; INTRAVENOUS ONCE
Status: COMPLETED | OUTPATIENT
Start: 2024-10-21 | End: 2024-10-21

## 2024-10-21 RX ORDER — FLUTICASONE PROPIONATE 50 MCG
2 SPRAY, SUSPENSION (ML) NASAL DAILY
Qty: 16 G | Refills: 0 | Status: ON HOLD | OUTPATIENT
Start: 2024-10-21 | End: 2024-11-20

## 2024-10-21 RX ORDER — KETOROLAC TROMETHAMINE 15 MG/ML
15 INJECTION, SOLUTION INTRAMUSCULAR; INTRAVENOUS ONCE
Status: COMPLETED | OUTPATIENT
Start: 2024-10-21 | End: 2024-10-21

## 2024-10-21 RX ORDER — DIPHENHYDRAMINE HYDROCHLORIDE 50 MG/ML
25 INJECTION INTRAMUSCULAR; INTRAVENOUS ONCE
Status: COMPLETED | OUTPATIENT
Start: 2024-10-21 | End: 2024-10-21

## 2024-10-21 NOTE — ED PROVIDER NOTES
Patient Seen in: [facility] Emergency Department    History     Chief Complaint   Patient presents with    Fever       HPI    31-year-old female here with 3 days of bifrontal headache in addition to fevers, myalgias, chills.  Denies any chest pain or dyspnea nor any urinary symptoms or back pain.  No focal weakness or numbness or paresthesias or vision changes.  She also reports frontal sinus and maxillary sinus pressure.  Denies any OCP use.    History reviewed.   Past Medical History:    Anxiety    Lactose intolerance       History reviewed.   Past Surgical History:   Procedure Laterality Date    Cholecystectomy  2013         Medications :  Prescriptions Prior to Admission[1]     Family History   Problem Relation Age of Onset    Other (Other) Mother         thyroid tumor       Smoking Status:   Social History     Socioeconomic History    Marital status:     Number of children: 0   Occupational History    Occupation: investments   Tobacco Use    Smoking status: Never    Smokeless tobacco: Never   Vaping Use    Vaping status: Never Used   Substance and Sexual Activity    Alcohol use: Yes     Alcohol/week: 0.0 standard drinks of alcohol     Comment: socially    Drug use: No    Sexual activity: Yes     Partners: Male       Constitutional and vital signs reviewed.      Social History and Family History elements reviewed from today, pertinent positives to the presenting problem noted.    Physical Exam     ED Triage Vitals [10/21/24 0913]   BP (!) 89/49   Pulse 84   Resp 16   Temp 98.6 °F (37 °C)   Temp src Oral   SpO2 97 %   O2 Device None (Room air)       All measures to prevent infection transmission during my interaction with the patient were taken. The patient was already wearing a droplet mask on my arrival to the room. Personal protective equipment was worn throughout the duration of the exam.  Handwashing was performed prior to and after the exam.  Stethoscope and any equipment used during my  examination was cleaned with super sani-cloth germicidal wipes following the exam.     Physical Exam    General: NAD  Head: Normocephalic and atraumatic.  Mouth/Throat/Ears/Nose: Oropharynx is clear and moist. Mild discomfort with palpation of bilaterally maxillary region . No facial skin changes.   Eyes: Conjunctivae and EOM are normal.   Neck: Normal range of motion. Supple.   Cardiovascular: Normal rate, regular rhythm, normal heart sounds.  Respiratory/Chest: Clear and equal bilaterally. Exhibits no tenderness.  Gastrointestinal: Soft, non-tender, non-distended. Bowel sounds are normal.   Musculoskeletal:No swelling or deformity.   Neurological: Alert and appropriate. No focal deficits. AOx4  CN II-XII grossly intact  5/5 strength: Left  strength, deltoid abduction, achilles, patella  5/5 strength: Right  strength, deltoid abduction, achilles, patella   SILT to bilateral upper and lower extremities   normal gait . Normal FTN     Skin: Skin is warm and dry. No pallor.  Psychiatric: Has a normal mood and affect.      ED Course        Labs Reviewed   URINALYSIS WITH CULTURE REFLEX - Abnormal; Notable for the following components:       Result Value    Clarity Urine Turbid (*)     Spec Gravity >1.030 (*)     Ketones Urine Trace (*)     Blood Urine 1+ (*)     Protein Urine 70 (*)     Urobilinogen Urine 2 (*)     Leukocyte Esterase Urine 25 (*)     RBC Urine 6-10 (*)     Squamous Epi. Cells Few (*)     All other components within normal limits   BASIC METABOLIC PANEL (8) - Abnormal; Notable for the following components:    Sodium 135 (*)     BUN 8 (*)     Calcium, Total 8.6 (*)     All other components within normal limits   CBC WITH DIFFERENTIAL WITH PLATELET - Abnormal; Notable for the following components:    WBC 2.0 (*)     .0 (*)     Neutrophil Absolute Prelim 1.36 (*)     Neutrophil Absolute 1.36 (*)     Lymphocyte Absolute 0.51 (*)     All other components within normal limits   POCT PREGNANCY  URINE - Normal   SARS-COV-2/FLU A AND B/RSV BY PCR (GENEXPERT) - Normal    Narrative:     This test is intended for the qualitative detection and differentiation of SARS-CoV-2, influenza A, influenza B, and respiratory syncytial virus (RSV) viral RNA in nasopharyngeal or nares swabs from individuals suspected of respiratory viral infection consistent with COVID-19 by their healthcare provider. Signs and symptoms of respiratory viral infection due to SARS-CoV-2, influenza, and RSV can be similar.                                    Test performed using the Xpert Xpress SARS-CoV-2/FLU/RSV (real time RT-PCR)  assay on the GeneXpert instrument, [place], [place] [address].                   This test is being used under the Food and Drug Administration's Emergency Use Authorization.                                    The authorized Fact Sheet for Healthcare Providers for this assay is available upon request from the laboratory.   MD BLOOD SMEAR CONSULT   RAINBOW DRAW LAVENDER   RAINBOW DRAW LIGHT GREEN   RAINBOW DRAW BLUE   RAINBOW DRAW GOLD   URINE CULTURE, ROUTINE       As Interpreted by me    Imaging Results Available and Reviewed while in ED: No results found.  ED Medications Administered:   Medications   sodium chloride 0.9 % IV bolus 1,000 mL (0 mL Intravenous Stopped 10/21/24 1102)   metoclopramide (Reglan) 5 mg/mL injection 10 mg (10 mg Intravenous Given 10/21/24 1018)   diphenhydrAMINE (Benadryl) 50 mg/mL  injection 25 mg (25 mg Intravenous Given 10/21/24 1018)   sodium chloride 0.9 % IV bolus 1,000 mL (0 mL Intravenous Stopped 10/21/24 1202)   ketorolac (Toradol) 15 MG/ML injection 15 mg (15 mg Intravenous Given 10/21/24 1109)         MDM     Vitals:    10/21/24 1020 10/21/24 1100 10/21/24 1201 10/21/24 1232   BP:  (!) 84/55 (S) (!) 88/55 100/63   Pulse:  68 74 70   Resp:       Temp: 98 °F (36.7 °C)      TempSrc: Oral      SpO2:  100% 100% 100%   Weight:       Height:         *I personally reviewed and  interpreted all ED vitals.    Pulse Ox: 100%, Room air, Normal           Medical Decision Making      Differential Diagnosis/ Diagnostic Considerations: Sinusitis, UTI, COVID-19.    Complicating Factors: The patient already has does not have any pertinent problems on file. to contribute to the complexity of this ED evaluation.    I reviewed prior chart records including immediate care visit note from yesterday.  Patient here with most likely sinusitis, antibiotics provided.  No clinical evidence of meningitis.  Labs reviewed and unremarkable for acute findings on my interpretation.  Nontoxic-appearing    Dc In stable condition.  Patient is comfortable with the plan.  Prescriptions: Flonase, Augmentin      Disposition and Plan     Clinical Impression:  1. Acute sinusitis, recurrence not specified, unspecified location    2. Nonintractable headache, unspecified chronicity pattern, unspecified headache type        Disposition:  Discharge    Follow-up:  London Michel  EElvin AGUILAR [address] [address]  [phone]    Schedule an appointment as soon as possible for a visit in 1 day(s)        Medications Prescribed:  Discharge Medication List as of 10/21/2024 12:33 PM        START taking these medications    Details   !! fluticasone propionate 50 MCG/ACT Nasal Suspension 2 sprays by Nasal route daily., Normal, Disp-16 g, R-0      !! amoxicillin clavulanate 875-125 MG Oral Tab Take 1 tablet by mouth 2 (two) times daily for 7 days., Normal, Disp-14 tablet, R-0       !! - Potential duplicate medications found. Please discuss with provider.                           [1] (Not in a hospital admission)

## 2024-10-21 NOTE — ED INITIAL ASSESSMENT (HPI)
Bee reports fevers x 2 days with severe headaches. Seen at  yesterday with negative viral panel. Woke up with a \"soaking wet bed\" last night due to fevers. Took tylenol pta

## 2024-10-25 ENCOUNTER — HOSPITAL ENCOUNTER (INPATIENT)
Facility: HOSPITAL | Age: 34
LOS: 4 days | Discharge: HOME | End: 2024-10-29
Attending: EMERGENCY MEDICINE | Admitting: HOSPITALIST
Payer: COMMERCIAL

## 2024-10-25 ENCOUNTER — APPOINTMENT (OUTPATIENT)
Dept: GENERAL RADIOLOGY | Facility: HOSPITAL | Age: 34
End: 2024-10-25
Attending: HOSPITALIST
Payer: COMMERCIAL

## 2024-10-25 ENCOUNTER — HOSPITAL ENCOUNTER (OUTPATIENT)
Facility: HOSPITAL | Age: 34
Setting detail: OBSERVATION
Discharge: HOME | End: 2024-10-29
Attending: EMERGENCY MEDICINE | Admitting: HOSPITALIST
Payer: COMMERCIAL

## 2024-10-25 DIAGNOSIS — D72.819 LEUKOPENIA, UNSPECIFIED TYPE: Primary | ICD-10-CM

## 2024-10-25 DIAGNOSIS — R60.0 PERIPHERAL EDEMA: ICD-10-CM

## 2024-10-25 DIAGNOSIS — R74.01 TRANSAMINITIS: ICD-10-CM

## 2024-10-25 PROBLEM — D69.6 THROMBOCYTOPENIA (HCC): Status: ACTIVE | Noted: 2024-10-25

## 2024-10-25 LAB
ADENOVIRUS PCR:: NOT DETECTED
ALBUMIN SERPL-MCNC: 3.6 G/DL (ref 3.2–4.8)
ALBUMIN/GLOB SERPL: 1.6 {RATIO} (ref 1–2)
ALP LIVER SERPL-CCNC: 137 U/L
ALT SERPL-CCNC: 344 U/L
ANION GAP SERPL CALC-SCNC: 6 MMOL/L (ref 0–18)
APTT PPP: 32.7 SECONDS (ref 23–36)
AST SERPL-CCNC: 195 U/L (ref ?–34)
B PARAPERT DNA SPEC QL NAA+PROBE: NOT DETECTED
B PERT DNA SPEC QL NAA+PROBE: NOT DETECTED
B-HCG UR QL: NEGATIVE
BASOPHILS # BLD AUTO: 0.02 X10(3) UL (ref 0–0.2)
BASOPHILS NFR BLD AUTO: 0.5 %
BILIRUB SERPL-MCNC: 0.3 MG/DL (ref 0.3–1.2)
BUN BLD-MCNC: 6 MG/DL (ref 9–23)
BUN/CREAT SERPL: 10.2 (ref 10–20)
C PNEUM DNA SPEC QL NAA+PROBE: NOT DETECTED
CALCIUM BLD-MCNC: 8.1 MG/DL (ref 8.7–10.4)
CHLORIDE SERPL-SCNC: 110 MMOL/L (ref 98–112)
CO2 SERPL-SCNC: 25 MMOL/L (ref 21–32)
CORONAVIRUS 229E PCR:: NOT DETECTED
CORONAVIRUS HKU1 PCR:: NOT DETECTED
CORONAVIRUS NL63 PCR:: NOT DETECTED
CORONAVIRUS OC43 PCR:: NOT DETECTED
CREAT BLD-MCNC: 0.59 MG/DL
CRP SERPL-MCNC: 1.5 MG/DL (ref ?–1)
DEPRECATED RDW RBC AUTO: 42.3 FL (ref 35.1–46.3)
EGFRCR SERPLBLD CKD-EPI 2021: 122 ML/MIN/1.73M2 (ref 60–?)
EOSINOPHIL # BLD AUTO: 0.01 X10(3) UL (ref 0–0.7)
EOSINOPHIL NFR BLD AUTO: 0.2 %
ERYTHROCYTE [DISTWIDTH] IN BLOOD BY AUTOMATED COUNT: 12.9 % (ref 11–15)
ERYTHROCYTE [SEDIMENTATION RATE] IN BLOOD: 3 MM/HR
FLUAV RNA SPEC QL NAA+PROBE: NOT DETECTED
FLUBV RNA SPEC QL NAA+PROBE: NOT DETECTED
GLOBULIN PLAS-MCNC: 2.3 G/DL (ref 2–3.5)
GLUCOSE BLD-MCNC: 93 MG/DL (ref 70–99)
HAV IGM SER QL: NONREACTIVE
HBV CORE IGM SER QL: NONREACTIVE
HBV SURFACE AG SERPL QL IA: NONREACTIVE
HCT VFR BLD AUTO: 34.4 %
HCV AB SERPL QL IA: NONREACTIVE
HGB BLD-MCNC: 11.7 G/DL
IMM GRANULOCYTES # BLD AUTO: 0.02 X10(3) UL (ref 0–1)
IMM GRANULOCYTES NFR BLD: 0.5 %
INR BLD: 1.04 (ref 0.8–1.2)
LACTATE SERPL-SCNC: 1.1 MMOL/L (ref 0.5–2)
LYMPHOCYTES # BLD AUTO: 1.54 X10(3) UL (ref 1–4)
LYMPHOCYTES NFR BLD AUTO: 37.3 %
MCH RBC QN AUTO: 30.2 PG (ref 26–34)
MCHC RBC AUTO-ENTMCNC: 34 G/DL (ref 31–37)
MCV RBC AUTO: 88.7 FL
METAPNEUMOVIRUS PCR:: NOT DETECTED
MONOCYTES # BLD AUTO: 0.29 X10(3) UL (ref 0.1–1)
MONOCYTES NFR BLD AUTO: 7 %
MYCOPLASMA PNEUMONIA PCR:: NOT DETECTED
NEUTROPHILS # BLD AUTO: 2.25 X10 (3) UL (ref 1.5–7.7)
NEUTROPHILS # BLD AUTO: 2.25 X10(3) UL (ref 1.5–7.7)
NEUTROPHILS NFR BLD AUTO: 54.5 %
OSMOLALITY SERPL CALC.SUM OF ELEC: 289 MOSM/KG (ref 275–295)
PARAINFLUENZA 1 PCR:: NOT DETECTED
PARAINFLUENZA 2 PCR:: NOT DETECTED
PARAINFLUENZA 3 PCR:: NOT DETECTED
PARAINFLUENZA 4 PCR:: NOT DETECTED
PLATELET # BLD AUTO: 113 10(3)UL (ref 150–450)
PLATELETS.RETICULATED NFR BLD AUTO: 5.3 % (ref 0–7)
POTASSIUM SERPL-SCNC: 4.1 MMOL/L (ref 3.5–5.1)
PROT SERPL-MCNC: 5.9 G/DL (ref 5.7–8.2)
PROTHROMBIN TIME: 14.3 SECONDS (ref 11.6–14.8)
RBC # BLD AUTO: 3.88 X10(6)UL
RHINOVIRUS/ENTERO PCR:: NOT DETECTED
RSV RNA SPEC QL NAA+PROBE: NOT DETECTED
SARS-COV-2 RNA NPH QL NAA+NON-PROBE: NOT DETECTED
SODIUM SERPL-SCNC: 141 MMOL/L (ref 136–145)
WBC # BLD AUTO: 4.1 X10(3) UL (ref 4–11)

## 2024-10-25 PROCEDURE — 71045 X-RAY EXAM CHEST 1 VIEW: CPT | Performed by: HOSPITALIST

## 2024-10-25 RX ORDER — METOCLOPRAMIDE HYDROCHLORIDE 5 MG/ML
5 INJECTION INTRAMUSCULAR; INTRAVENOUS ONCE
Status: COMPLETED | OUTPATIENT
Start: 2024-10-25 | End: 2024-10-25

## 2024-10-25 RX ORDER — KETOROLAC TROMETHAMINE 15 MG/ML
15 INJECTION, SOLUTION INTRAMUSCULAR; INTRAVENOUS ONCE
Status: COMPLETED | OUTPATIENT
Start: 2024-10-25 | End: 2024-10-25

## 2024-10-25 RX ORDER — POLYETHYLENE GLYCOL 3350 17 G/17G
17 POWDER, FOR SOLUTION ORAL DAILY PRN
Status: DISCONTINUED | OUTPATIENT
Start: 2024-10-25 | End: 2024-10-29

## 2024-10-25 RX ORDER — DIPHENHYDRAMINE HYDROCHLORIDE 50 MG/ML
25 INJECTION INTRAMUSCULAR; INTRAVENOUS ONCE
Status: COMPLETED | OUTPATIENT
Start: 2024-10-25 | End: 2024-10-25

## 2024-10-25 RX ORDER — ESCITALOPRAM OXALATE 10 MG/1
10 TABLET ORAL
COMMUNITY
Start: 2023-04-18

## 2024-10-25 RX ORDER — PROCHLORPERAZINE EDISYLATE 5 MG/ML
5 INJECTION INTRAMUSCULAR; INTRAVENOUS EVERY 8 HOURS PRN
Status: DISCONTINUED | OUTPATIENT
Start: 2024-10-25 | End: 2024-10-29

## 2024-10-25 RX ORDER — SENNOSIDES 8.6 MG
17.2 TABLET ORAL NIGHTLY PRN
Status: DISCONTINUED | OUTPATIENT
Start: 2024-10-25 | End: 2024-10-29

## 2024-10-25 RX ORDER — SPIRONOLACTONE 100 MG/1
2 TABLET, FILM COATED ORAL DAILY
COMMUNITY
Start: 2024-04-09

## 2024-10-25 RX ORDER — ACETAMINOPHEN 500 MG
500 TABLET ORAL EVERY 4 HOURS PRN
Status: DISCONTINUED | OUTPATIENT
Start: 2024-10-25 | End: 2024-10-29

## 2024-10-25 RX ORDER — ACETAMINOPHEN 500 MG
TABLET ORAL
Status: DISPENSED
Start: 2024-10-25 | End: 2024-10-26

## 2024-10-25 RX ORDER — ACETAMINOPHEN 500 MG
500 TABLET ORAL EVERY 4 HOURS PRN
Status: DISCONTINUED | OUTPATIENT
Start: 2024-10-25 | End: 2024-10-25

## 2024-10-25 RX ORDER — ESCITALOPRAM OXALATE 10 MG/1
10 TABLET ORAL
Status: DISCONTINUED | OUTPATIENT
Start: 2024-10-26 | End: 2024-10-29

## 2024-10-25 RX ORDER — ONDANSETRON 2 MG/ML
4 INJECTION INTRAMUSCULAR; INTRAVENOUS EVERY 6 HOURS PRN
Status: DISCONTINUED | OUTPATIENT
Start: 2024-10-25 | End: 2024-10-29

## 2024-10-25 RX ORDER — SPIRONOLACTONE 100 MG/1
200 TABLET, FILM COATED ORAL DAILY
Status: DISCONTINUED | OUTPATIENT
Start: 2024-10-26 | End: 2024-10-29

## 2024-10-25 RX ORDER — BISACODYL 10 MG
10 SUPPOSITORY, RECTAL RECTAL
Status: DISCONTINUED | OUTPATIENT
Start: 2024-10-25 | End: 2024-10-29

## 2024-10-25 RX ORDER — LEVONORGESTREL 52 MG/1
1 INTRAUTERINE DEVICE INTRAUTERINE ONCE
COMMUNITY

## 2024-10-25 NOTE — ED PROVIDER NOTES
Patient Seen in: [facility] Emergency Department      History     Chief Complaint   Patient presents with    Abnormal Labs     Stated Complaint: Abnormal Labs - PCP Ref    Subjective:   HPI      Patient presents the emergency department with several days of not feeling well and abnormal lab studies.  According to the patient and the  who helps provide history, patient presented to the emergency department over the weekend with fevers and bodyaches and headaches.  She was placed on antibiotics for what was suspected to be a sinus infection.  She was noted to have a low white blood cell count and on repeat level it was continuing to decline.  She saw an oncologist yesterday who geo another blood test and found that the blood count was continuing to decline and recommended she come to the hospital for admission and possible bone marrow biopsy.  She states that since starting the antibiotics she has had some swelling in her hands feet and face.  There is no neck pain or neck stiffness.  There is been some nausea.  There is a dull throbbing aching headache.  There is no other aggravating or alleviating factors.  She continues to have intermittent fever but she is afebrile on presentation today.    Objective:     Past Medical History:    Anxiety    Lactose intolerance              Past Surgical History:   Procedure Laterality Date    Cholecystectomy  2013                Social History     Socioeconomic History    Marital status:     Number of children: 0   Occupational History    Occupation: investments   Tobacco Use    Smoking status: Some Days     Types: Cigarettes    Smokeless tobacco: Never    Tobacco comments:     Once in a while   Vaping Use    Vaping status: Never Used   Substance and Sexual Activity    Alcohol use: Not Currently     Comment: socially    Drug use: No    Sexual activity: Yes     Partners: Male                  Physical Exam     ED Triage Vitals [10/25/24 1535]   /65    Pulse 88   Resp 21   Temp 98.2 °F (36.8 °C)   Temp src Oral   SpO2 97 %   O2 Device None (Room air)       Current Vitals:   Vital Signs  BP: 97/58  Pulse: 80  Resp: 15  Temp: 98.2 °F (36.8 °C)  Temp src: Oral    Oxygen Therapy  SpO2: 100 %  O2 Device: None (Room air)        Physical Exam  Vitals and nursing note reviewed.   Constitutional:       General: She is not in acute distress.     Appearance: She is well-developed.   HENT:      Head: Normocephalic.      Nose: Nose normal.      Mouth/Throat:      Mouth: Mucous membranes are moist.   Eyes:      Conjunctiva/sclera: Conjunctivae normal.   Cardiovascular:      Rate and Rhythm: Normal rate and regular rhythm.      Heart sounds: No murmur heard.  Pulmonary:      Effort: Pulmonary effort is normal. No respiratory distress.      Breath sounds: Normal breath sounds.   Abdominal:      General: There is no distension.      Palpations: Abdomen is soft.      Tenderness: There is no abdominal tenderness.   Musculoskeletal:         General: No tenderness. Normal range of motion.      Cervical back: Normal range of motion and neck supple.   Skin:     General: Skin is warm and dry.      Capillary Refill: Capillary refill takes less than 2 seconds.      Findings: No rash.   Neurological:      General: No focal deficit present.      Mental Status: She is alert and oriented to person, place, and time.      Cranial Nerves: No cranial nerve deficit.      Sensory: No sensory deficit.      Motor: No weakness.      Coordination: Coordination normal.      Gait: Gait normal.      Deep Tendon Reflexes: Reflexes normal.             ED Course     Labs Reviewed   CBC WITH DIFFERENTIAL WITH PLATELET - Abnormal; Notable for the following components:       Result Value    HGB 11.7 (*)     HCT 34.4 (*)     .0 (*)     All other components within normal limits   COMP METABOLIC PANEL (14) - Abnormal; Notable for the following components:    BUN 6 (*)     Calcium, Total 8.1 (*)       (*)      (*)     Alkaline Phosphatase 137 (*)     All other components within normal limits   C-REACTIVE PROTEIN - Abnormal; Notable for the following components:    C-Reactive Protein 1.50 (*)     All other components within normal limits   LACTIC ACID, PLASMA - Normal   PROTHROMBIN TIME (PT) - Normal   PTT, ACTIVATED - Normal   SED RATE, WESTERGREN (AUTOMATED) - Normal   POCT PREGNANCY URINE - Normal   RESPIRATORY FLU EXPAND PANEL + COVID-19 - Normal    Narrative:     This test is intended for the simultaneous qualitative detection and differentiation of nucleic acids from multiple viral and bacterial respiratory organisms, including nucleic acid from Severe Acute Respiratory Syndrome Coronavirus 2 (SARS-CoV-2) in nasopharyngeal swab from individuals suspected of respiratory viral infection consistent with COVID-19 by their healthcare provider.    Test performed using the [employer] Respiratory Panel 2.1 (RP2.1) assay on the [employer] 2.0 System, [employer], [place], [place] [address].    This test is being used under the Food and Drug Administration's Emergency Use Authorization.    The authorized Fact Sheet for Healthcare Providers for this assay is available upon request from the laboratory.    SARS and MERS coronaviruses are not tested on this assay.   HEPATITIS PANEL, ACUTE (4)   RAINBOW DRAW LAVENDER   RAINBOW DRAW LIGHT GREEN   RAINBOW DRAW BLUE   BLOOD CULTURE   BLOOD CULTURE                   MDM          Admission disposition: 10/25/2024  6:46 PM           Medical Decision Making  Differential diagnosis considered for viral illness, rheumatologic disorder, hematologic disease,    Problems Addressed:  Leukopenia, unspecified type: acute illness or injury  Peripheral edema: acute illness or injury  Transaminitis: acute illness or injury    Amount and/or Complexity of Data Reviewed  Labs: ordered. Decision-making details documented in ED Course.     Details: White blood cell just above 4.   Platelet count still mildly depressed.  Discussion of management or test interpretation with external provider(s): Discussed with oncology and hematology.  Will be seen in consultation by both.  Pain medication for headache given.  Seen by the hospitalist in the emergency department.    Risk  Prescription drug management.  Decision regarding hospitalization.        Disposition and Plan     Clinical Impression:  1. Leukopenia, unspecified type    2. Transaminitis    3. Peripheral edema         Disposition:  Admit  10/25/2024  6:46 pm    Follow-up:  No follow-up provider specified.        Medications Prescribed:  Current Discharge Medication List              Supplementary Documentation:         Hospital Problems       Present on Admission  Date Reviewed: 1/24/2020            ICD-10-CM Noted POA    * (Principal) Leukopenia, unspecified type D72.819 10/25/2024 Unknown    Thrombocytopenia (HCC) D69.6 10/25/2024 Yes

## 2024-10-25 NOTE — ED INITIAL ASSESSMENT (HPI)
Patient has been feeling not well for the past week and has been to the hospital and has followed up with MDs. Patient was advised to come to ER if she felt worse and she went to [facility] last night and her blood work showed elevated AST/ALT. She was advised to come back to ER to be admitted. Patient c/o headache and swelling to hands, and face. +Nausea, +Fevers 102.8F

## 2024-10-25 NOTE — ED QUICK NOTES
Orders for admission, patient is aware of plan and ready to go upstairs. Any questions, please call ED RN Alex at extension [phone].     Patient Covid vaccination status: Partially vaccinated     COVID Test Ordered in ED: $$$$Respiratory Flu Expanded Panel + Covid-19$$$$    COVID Suspicion at Admission: N/A    Running Infusions:  None    Mental Status/LOC at time of transport: A&Ox4    Other pertinent information:   CIWA score: N/A   NIH score:  N/A

## 2024-10-26 ENCOUNTER — APPOINTMENT (OUTPATIENT)
Dept: CT IMAGING | Facility: HOSPITAL | Age: 34
End: 2024-10-26
Attending: HOSPITALIST
Payer: COMMERCIAL

## 2024-10-26 ENCOUNTER — APPOINTMENT (OUTPATIENT)
Dept: ULTRASOUND IMAGING | Facility: HOSPITAL | Age: 34
End: 2024-10-26
Attending: HOSPITALIST
Payer: COMMERCIAL

## 2024-10-26 PROBLEM — R50.9 FEVER: Status: ACTIVE | Noted: 2024-10-26

## 2024-10-26 PROBLEM — D64.9 ANEMIA: Status: ACTIVE | Noted: 2024-10-26

## 2024-10-26 LAB
ALBUMIN SERPL-MCNC: 3.5 G/DL (ref 3.2–4.8)
ALBUMIN/GLOB SERPL: 1.7 {RATIO} (ref 1–2)
ALP LIVER SERPL-CCNC: 140 U/L
ALT SERPL-CCNC: 376 U/L
ANION GAP SERPL CALC-SCNC: 6 MMOL/L (ref 0–18)
APTT PPP: 32.9 SECONDS (ref 23–36)
AST SERPL-CCNC: 222 U/L (ref ?–34)
BASOPHILS # BLD AUTO: 0.02 X10(3) UL (ref 0–0.2)
BASOPHILS NFR BLD AUTO: 0.4 %
BILIRUB SERPL-MCNC: 0.4 MG/DL (ref 0.3–1.2)
BILIRUB UR QL: NEGATIVE
BUN BLD-MCNC: 7 MG/DL (ref 9–23)
BUN/CREAT SERPL: 12.7 (ref 10–20)
CALCIUM BLD-MCNC: 7.9 MG/DL (ref 8.7–10.4)
CHLORIDE SERPL-SCNC: 107 MMOL/L (ref 98–112)
CLARITY UR: CLEAR
CO2 SERPL-SCNC: 25 MMOL/L (ref 21–32)
CREAT BLD-MCNC: 0.55 MG/DL
D DIMER PPP FEU-MCNC: 1.19 UG/ML FEU (ref ?–0.5)
DEPRECATED HBV CORE AB SER IA-ACNC: 66 NG/ML
DEPRECATED RDW RBC AUTO: 42.8 FL (ref 35.1–46.3)
EGFRCR SERPLBLD CKD-EPI 2021: 124 ML/MIN/1.73M2 (ref 60–?)
EOSINOPHIL # BLD AUTO: 0.01 X10(3) UL (ref 0–0.7)
EOSINOPHIL NFR BLD AUTO: 0.2 %
ERYTHROCYTE [DISTWIDTH] IN BLOOD BY AUTOMATED COUNT: 12.8 % (ref 11–15)
FIBRINOGEN PPP-MCNC: 264 MG/DL (ref 200–480)
FOLATE SERPL-MCNC: 14.3 NG/ML (ref 5.4–?)
GLOBULIN PLAS-MCNC: 2.1 G/DL (ref 2–3.5)
GLUCOSE BLD-MCNC: 83 MG/DL (ref 70–99)
GLUCOSE UR-MCNC: NORMAL MG/DL
HAPTOGLOB SERPL-MCNC: 121 MG/DL (ref 30–200)
HBV CORE AB SERPL QL IA: NONREACTIVE
HBV SURFACE AB SER QL: REACTIVE
HBV SURFACE AB SERPL IA-ACNC: 18.21 MIU/ML
HBV SURFACE AG SER-ACNC: <0.1 [IU]/L
HBV SURFACE AG SERPL QL IA: NONREACTIVE
HCT VFR BLD AUTO: 32.5 %
HCV AB SERPL QL IA: NONREACTIVE
HGB BLD-MCNC: 11 G/DL
IMM GRANULOCYTES # BLD AUTO: 0.01 X10(3) UL (ref 0–1)
IMM GRANULOCYTES NFR BLD: 0.2 %
INR BLD: 1.05 (ref 0.8–1.2)
IRON SATN MFR SERPL: 4 %
IRON SERPL-MCNC: 13 UG/DL
LDH SERPL L TO P-CCNC: 373 U/L
LEUKOCYTE ESTERASE UR QL STRIP.AUTO: NEGATIVE
LYMPHOCYTES # BLD AUTO: 1.51 X10(3) UL (ref 1–4)
LYMPHOCYTES NFR BLD AUTO: 31 %
MCH RBC QN AUTO: 30.9 PG (ref 26–34)
MCHC RBC AUTO-ENTMCNC: 33.8 G/DL (ref 31–37)
MCV RBC AUTO: 91.3 FL
MONOCYTES # BLD AUTO: 0.31 X10(3) UL (ref 0.1–1)
MONOCYTES NFR BLD AUTO: 6.4 %
NEUTROPHILS # BLD AUTO: 3.01 X10 (3) UL (ref 1.5–7.7)
NEUTROPHILS # BLD AUTO: 3.01 X10(3) UL (ref 1.5–7.7)
NEUTROPHILS NFR BLD AUTO: 61.8 %
NITRITE UR QL STRIP.AUTO: NEGATIVE
OSMOLALITY SERPL CALC.SUM OF ELEC: 283 MOSM/KG (ref 275–295)
PH UR: 5.5 [PH] (ref 5–8)
PLATELET # BLD AUTO: 125 10(3)UL (ref 150–450)
POTASSIUM SERPL-SCNC: 3.7 MMOL/L (ref 3.5–5.1)
PROCALCITONIN SERPL-MCNC: 0.12 NG/ML (ref ?–0.05)
PROT SERPL-MCNC: 5.6 G/DL (ref 5.7–8.2)
PROT UR-MCNC: NEGATIVE MG/DL
PROTHROMBIN TIME: 14.4 SECONDS (ref 11.6–14.8)
RBC # BLD AUTO: 3.56 X10(6)UL
SODIUM SERPL-SCNC: 138 MMOL/L (ref 136–145)
SP GR UR STRIP: 1.01 (ref 1–1.03)
TIBC SERPL-MCNC: 291 UG/DL (ref 250–425)
TRANSFERRIN SERPL-MCNC: 195 MG/DL (ref 250–380)
UROBILINOGEN UR STRIP-ACNC: NORMAL
VIT B12 SERPL-MCNC: 1415 PG/ML (ref 211–911)
WBC # BLD AUTO: 4.9 X10(3) UL (ref 4–11)

## 2024-10-26 PROCEDURE — 99221 1ST HOSP IP/OBS SF/LOW 40: CPT | Performed by: STUDENT IN AN ORGANIZED HEALTH CARE EDUCATION/TRAINING PROGRAM

## 2024-10-26 PROCEDURE — 70450 CT HEAD/BRAIN W/O DYE: CPT | Performed by: HOSPITALIST

## 2024-10-26 PROCEDURE — 76705 ECHO EXAM OF ABDOMEN: CPT | Performed by: HOSPITALIST

## 2024-10-26 RX ORDER — ENOXAPARIN SODIUM 100 MG/ML
40 INJECTION SUBCUTANEOUS DAILY
Status: DISCONTINUED | OUTPATIENT
Start: 2024-10-26 | End: 2024-10-26

## 2024-10-26 RX ORDER — BUTALBITAL, ACETAMINOPHEN AND CAFFEINE 50; 325; 40 MG/1; MG/1; MG/1
2 TABLET ORAL EVERY 4 HOURS PRN
Status: DISCONTINUED | OUTPATIENT
Start: 2024-10-26 | End: 2024-10-29

## 2024-10-26 RX ORDER — KETOROLAC TROMETHAMINE 15 MG/ML
15 INJECTION, SOLUTION INTRAMUSCULAR; INTRAVENOUS ONCE
Status: COMPLETED | OUTPATIENT
Start: 2024-10-26 | End: 2024-10-26

## 2024-10-26 NOTE — CONSULTS
[facility]  part of [facility] Infectious Disease  Report of Consultation    Bee Luna Patient Status:  Inpatient    1990 MRN [id number]   Location [facility] 4W/SW/SE Attending Kaia Allen MD   Hosp Day # 1 PCP London Michel MD     Date of Admission:  10/25/2024  Date of Consult:  10/26/2024    Reason for Consultation:  Leukopenia    History of Present Illness:  Bee Luna is a a(n) 34 year old female being seen at your request regarding leukopenia.  Patient has had several ED visits recently for leukopenia.  She also has seen heme 10/23/24 and at that time c/o fevers, headache, myalgias, etc.  Infectious w/u as an outpatient has been unrevealing.  She was empirically given augmentin without improvement.      Patient does endorse recent travel outside of the country with additional significant body aches, joint pains.  1 4yo child healthy.    Patient did have flow cytometry sent and is awaiting tertiary center opinion.  As she was not feeling better she presented to the ED for further evaluation.  WBCs are presently coming up into the normal range.  LFTs elevated, hepatitis panel is negative.  HIV pending.  Autoimmune labs also pending.  We are asked to see and assist.    History:  Past Medical History:    Anxiety    Lactose intolerance     Past Surgical History:   Procedure Laterality Date    Cholecystectomy       Family History   Problem Relation Age of Onset    Other (Other) Mother         thyroid tumor      reports that she has been smoking cigarettes. She has never used smokeless tobacco. She reports that she does not currently use alcohol. She reports that she does not use drugs.    Allergies:  Allergies[1]    Medications:    Current Facility-Administered Medications:     butalbital-acetaminophen-caffeine (Fioricet) -40 MG per tab 2 tablet, 2 tablet, Oral, Q4H PRN    escitalopram (Lexapro) tab 10 mg, 10 mg, Oral,  Daily with breakfast    melatonin cap/tab 5 mg, 5 mg, Oral, Nightly    spironolactone (Aldactone) tab 200 mg, 200 mg, Oral, Daily    polyethylene glycol (PEG 3350) (Miralax) 17 g oral packet 17 g, 17 g, Oral, Daily PRN    sennosides (Senokot) tab 17.2 mg, 17.2 mg, Oral, Nightly PRN    bisacodyl (Dulcolax) 10 MG rectal suppository 10 mg, 10 mg, Rectal, Daily PRN    ondansetron (Zofran) 4 MG/2ML injection 4 mg, 4 mg, Intravenous, Q6H PRN    prochlorperazine (Compazine) 10 MG/2ML injection 5 mg, 5 mg, Intravenous, Q8H PRN    acetaminophen (Tylenol Extra Strength) tab 500 mg, 500 mg, Oral, Q4H PRN    Review of Systems:    Constitutional:  Fever.   HEENT:  No visual changes, oral ulcers, sore throat, difficulty swallowing.   Respiratory: Negative for cough, sputum, hemoptysis, chest pain, wheezing, dyspnea on exertion, or stridor.   Cardiovascular: Negative for chest pain, palpitations, irregular heart beats.   Gastrointestinal:  No abdominal pain, nausea, vomiting, diarrhea, or constipation.   Genitourinary:  No dysuria, hematuria, urine urgency or frequency.   Integument/breast: Negative for rash, skin lesions, and pruritus.   Hematologic/lymphatic: Negative for easy bruising, bleeding, and lymphadenopathy.   Musculoskeletal: Myalgias.   Neurological: Headaches.   Psych:  No h/o anxiety, depression, other psych d/o.   Endocrine: No history of of diabetes, thyroid disorder.    Remainder of 12 point review of systems otherwise negative.    Vital signs in last 24 hours:  Patient Vitals for the past 24 hrs:   BP Temp Temp src Pulse Resp SpO2 Height Weight   10/26/24 0624 91/48 97.9 °F (36.6 °C) Oral -- 16 100 % -- --   10/26/24 0300 -- -- -- 75 -- -- -- --   10/25/24 2137 -- -- -- 92 -- -- -- --   10/25/24 2027 95/57 98.4 °F (36.9 °C) -- 81 18 100 % -- --   10/25/24 2024 -- -- -- -- -- -- -- 166 lb (75.3 kg)   10/25/24 2004 97/54 -- -- 74 16 99 % -- --   10/25/24 1919 93/56 98.9 °F (37.2 °C) Oral 78 15 99 % -- --    10/25/24 1830 97/58 -- -- 80 15 100 % -- --   10/25/24 1743 97/58 -- -- 78 16 99 % -- --   10/25/24 1535 104/65 98.2 °F (36.8 °C) Oral 88 21 97 % 5' 7\" (1.702 m) 166 lb (75.3 kg)       Intake/Output:  No intake/output data recorded.    Physical Exam:   General: Awake, alert, non-tox and in NAD.   Head: Normocephalic, without obvious abnormality, atraumatic.   Eyes: Conjunctivae/corneas clear.  No scleral icterus.  No conjunctival     hemorrhage.   Nose: Nares normal.   Throat:  Oropharynx clear, MMs moist.   Neck: Trachea ML, no masses.   Lungs: CTA b/l no rhonchi, rales, wheezes.   Chest wall: No tenderness or deformity.   Heart: Regular rate and rhythm, normal S1S2, no murmurs.   Abdomen: Soft, NT/ND.  Bowel sounds present.  No organomegaly.   Extremity: No edema.   Skin: No rashes or lesions.   Neurological: No focal neurologic deficits.    Lab Data Review:  Lab Results   Component Value Date    WBC 4.9 10/26/2024    HGB 11.0 10/26/2024    HCT 32.5 10/26/2024    .0 10/26/2024    CREATSERUM 0.59 10/25/2024    BUN 6 10/25/2024     10/25/2024    K 4.1 10/25/2024     10/25/2024    CO2 25.0 10/25/2024    GLU 93 10/25/2024    CA 8.1 10/25/2024    ALB 3.6 10/25/2024    ALKPHO 137 10/25/2024    BILT 0.3 10/25/2024    TP 5.9 10/25/2024     10/25/2024     10/25/2024    PTT 32.7 10/25/2024    INR 1.04 10/25/2024    ESRML 3 10/25/2024    CRP 1.50 10/25/2024      Cultures:   Blood cultures pending  RVP negative    Radiology:  CONCLUSION:      No acute cardiopulmonary abnormality.       Assessment and Plan:    Syndrome of HA, fever, myalgias and leukopenia leading to several ED and outpatient evaluations in recent weeks  - Flow cytometry sent and plans for tertiary care center evaluation  - WBCs coming up, LFTs elevated  - Hepatitis panel is negative, HIV pending    2.  Transaminitis ?viral process leading to the above  - RVP negative  - Add EBV, CMV, Parvovirus studies  - Check Lyme IgM/IgG,  West Nile, chikungunya, dengue  - Check PCT and we can add empiric antimicrobials if needed  - HIV pending    3.  Intractable headache  - Further w/u ongoing, may need LP if symptoms do not improve    4.  Disposition -inpatient.  Supportive care ongoing.  Some autoimmune labs pending but with numbers coming up it appears a primary bone marrow issue is less likely.  Will add viral studies, Lyme, PCT.  HA ongoing so low threshold for LP with MEM panel - less likely to be a bacterial meningitis - this would be to look for viral, vectorborne processes as most likely etiology of events.  If PCT elevated we can add empiric antimicrobials.  D/w Dr. Allen.  D/w patient.  Will follow.    Asiya Cassidy [facility] Infectious Disease  [phone]    10/26/2024  9:25 AM         [1]   Allergies  Allergen Reactions    Lactose Intolerance NAUSEA AND VOMITING and SWELLING

## 2024-10-26 NOTE — PROGRESS NOTES
[facility]  part of [facility]     [facility] Hospitalist Progress Note     PCP: London Michel MD    CC: Follow up       Assessment/Plan:     Bee Luna is a 34 year old female with PMH sig for anxiety, prior lap bahman who has had several ER and clinic visits for fevers and mild pancytopenia.  Saw Heme on 10/23 and  reported multiple sx including fevfesr, HA, mylagias, chills, and dizzinesss.  Infectious work up so far neg.  Did have WBC 2 Hgb 13.9 Plts 121, ANC 1360,  Differential %'s normal, peripheral smear reviewed by pathology: mild neutropenia, mild lymphocytopenia, mild thrombocytopenia, neutrophils with reactive features such as cytoplasmic vacuoles, lymphocytes appear mature and unremarkable, plts slightly decreased, no platelet clumping or circulating blasts seen.   Was put on augmentin without improvement.  Flow cytometry sent, was trying to get into [facility] for earlier appointment.  Came into er due to persistent sx.  Also feels abx made her hands and feet and face swell althought exam not remarkable, no hives.  No other rashes.  Pt feels better after HA cocktail.  LFTs are also elevated.  Plan for heme and ID work up.       Mild pancytopenia  -wbc was 2 as oupt, now 4, plts still low around 110  -hgb 11.7  -flow cytometry outpt pending  -Follow up heme recs  -Hemoglobin 11, platelets improved to 125, white count now 4.9     Intermittent fevers  -afebrile here, RVP neg  -blood cultrues drawn  -ESR nromal,. CRp mildly elevated, lactic normal   -monitor off abx for now,. ID consult  -see lfts below  -Ferritin markedly elevated.  No signs of hemolysis.     Elevated LFTs   -ouytpt ap 127, ast 121, alt 197  -repeat ap 137, , , mild uptick today, repeat in a.m.  - will get US   -Acute hepatitis panel negative.     FEN: IVF prn, lytes in AM, gen diet     PPX; SCD, Monitor plts, hold on any AC, platelets are improved still under 150 but if headaches do not improve may  need LP.     Dipso full code pending course    Questions/concerns were discussed with patient and/or family by bedside.    Note: This chart was prepared using voice recognition software and may contain unintended word substitution errors.     Discussed with  at bedside      Thank You,  Kaia Allen M.D.  [facility] Hospitalist  Answering Service: [phone]        Subjective     Had headache again today.  Fioricet seems to be helping.  Afebrile overnight no CP, SOB, or N/V.      Objective     OBJECTIVE:  Temp:  [97.8 °F (36.6 °C)-98.9 °F (37.2 °C)] 97.8 °F (36.6 °C)  Pulse:  [74-92] 81  Resp:  [15-21] 18  BP: ()/(48-65) 89/53  SpO2:  [97 %-100 %] 100 %    Intake/Output:    Intake/Output Summary (Last 24 hours) at 10/26/2024 1503  Last data filed at 10/26/2024 0600  Gross per 24 hour   Intake 1720 ml   Output 700 ml   Net 1020 ml       Last 3 Weights   10/25/24 2024 166 lb (75.3 kg)   10/25/24 1535 166 lb (75.3 kg)   10/21/24 0913 155 lb (70.3 kg)   01/13/20 1606 200 lb (90.7 kg)       Exam  Gen: No acute distress, alert and oriented x3  Neck Supple, no JVD  Pulm: Lungs clear, normal respiratory effort, No wheezing or crackles  CV: Heart with regular rate and rhythm, No murmurs, rubs, gallops  Abd: Abdomen soft, nontender, nondistended, no organomegaly, bowel sounds present  MSK:  no clubbing, no cyanosis.  No Lower extremity edema  Skin: no rashes or lesions, well perfused  Psych: mood stable, cooperative  Neuro: no focal deficits    Medications      escitalopram  10 mg Oral Daily with breakfast    melatonin  5 mg Oral Nightly    [Held by provider] spironolactone  200 mg Oral Daily         butalbital-acetaminophen-caffeine    polyethylene glycol (PEG 3350)    sennosides    bisacodyl    ondansetron    prochlorperazine    acetaminophen    Data Review:       Labs:     Recent Labs   Lab 10/21/24  0921 10/25/24  1727 10/26/24  0903   WBC 2.0* 4.1 4.9   HGB 13.9 11.7* 11.0*   MCV 88.5 88.7 91.3   PLT  121.0* 113.0* 125.0*   INR  --  1.04 1.05       Recent Labs   Lab 10/21/24  0921 10/25/24  1727 10/26/24  0903   * 141 138   K 3.7 4.1 3.7    110 107   CO2 25.0 25.0 25.0   BUN 8* 6* 7*   CREATSERUM 0.68 0.59 0.55   CA 8.6* 8.1* 7.9*   GLU 95 93 83       Recent Labs   Lab 10/25/24  1727 10/26/24  0903   * 376*   * 222*   ALB 3.6 3.5   *  --        No results for input(s): \"PGLU\" in the last 168 hours.    No results for input(s): \"TROP\" in the last 168 hours.    Imaging:  US LIVER (CPT=76705)    Result Date: 10/26/2024  PROCEDURE: US LIVER (CPT=76705)  COMPARISON: None.  INDICATIONS: elevated LFTs  TECHNIQUE:   The liver was evaluated with gray scale and colorflow of the main vessels.   FINDINGS:  LIVER:   16.6 cm in length with homogeneous echogenicity.  No masses or bile duct dilatation. Color flow and Doppler imaging of portal and hepatic veins show patency and antegrade flow. BILE DUCTS:   Cholecystectomy.  Common bile duct is nondilated measures 0.3 cm. OTHER:   Visualized portions of pancreas appeared normal.  Right kidney measures 12.2 cm in length without hydronephrosis.          CONCLUSION:   Unremarkable sonographic appearance of the liver.  Cholecystectomy.     Dictated by (CST): Esa Hussien MD on 10/26/2024 at 1:01 PM     Finalized by (CST): Esa Hussein MD on 10/26/2024 at 1:02 PM          CT BRAIN OR HEAD (CPT=70450)    Result Date: 10/26/2024  PROCEDURE: CT BRAIN OR HEAD (CPT=70450)  COMPARISON: None.  INDICATIONS: Frontal and posterior headache.  TECHNIQUE: CT images were obtained without contrast material.  Automated exposure control for dose reduction was used.  Dose information is transmitted to the ACR (American College of Radiology) NRDR (National Radiology Data Registry) which includes the Dose Index Registry.  FINDINGS:  CSF SPACES: No hydrocephalus, subarachnoid hemorrhage, or effacement of the basal cisterns is appreciated. There is no extra-axial  fluid collection. CEREBRUM: No acute intraparenchymal hemorrhage, edema, or cortical sulcal effacement is apparent. There is no space-occupying lesion, mass effect, or shift of midline structures. The gray-white matter junction is preserved and bilaterally symmetric in appearance. CEREBELLUM: No edema, hemorrhage, mass, or acute infarction is seen. BRAINSTEM: No edema, hemorrhage, mass, or acute infarction is seen.  CALVARIUM: There is no apparent depressed fracture, mass, or other significant visible lesion.  SINUSES: Limited views demonstrate no significant mucosal thickening or fluid.  ORBITS: Limited views are grossly unremarkable.  OTHER: Negative.         CONCLUSION:   No acute intracranial process by noncontrast CT technique.   [facility]  Dictated by (CST): Abiodun Peraza MD on 10/26/2024 at 10:18 AM     Finalized by (CST): Abiodun Peraza MD on 10/26/2024 at 10:20 AM          XR CHEST AP PORTABLE  (CPT=71045)    Result Date: 10/25/2024  PROCEDURE: XR CHEST AP PORTABLE  (CPT=71045) TIME: 2103.   COMPARISON: None.  INDICATIONS: fever  TECHNIQUE:   Single view.   FINDINGS:  CARDIAC/VASC: Normal.  No cardiac silhouette abnormality or cardiomegaly.  Unremarkable pulmonary vasculature.  MEDIAST/JERARDO:   No visible mass or adenopathy. LUNGS/PLEURA: Normal.  No significant pulmonary parenchymal abnormalities.  No effusion or pleural thickening. BONES: No fracture or visible bony lesion. OTHER: Negative.          CONCLUSION:   No acute cardiopulmonary abnormality.    Dictated by (CST): Felix Duggan MD on 10/25/2024 at 9:51 PM     Finalized by (CST): Felix Duggan MD on 10/25/2024 at 9:51 PM

## 2024-10-26 NOTE — PLAN OF CARE
Patient admitted from ED for abnormal labs. Pt HA relieved with toradol. Up self, voiding freely. SL. Afebrile overnight. Monitoring labs, pending plan.Safety precautions in place.

## 2024-10-26 NOTE — CONSULTS
Hematology/Oncology Initial Consultation Note    Patient Name: Bee Luna  Medical Record Number: [id number]    YOB: 1990   Date of Consultation: 10/26/2024   Physician requesting consultation: Dr. Allen    Reason for Consultation:  Bee Luna was seen today for the diagnosis of Fever      =============================================================  History of Present Illness:      34-year-old female presenting with fevers of one week duration.  Patient had started last week, reaching all the way up to 102.8 °F.  It was associated with headaches.  And also she has not started noticing joint swelling and pains over the last day.  Denies having any runny nose, sinus congestion cough shortness of breath or chest discomfort.  No loose stools or diarrhea.       Noted to have mild anemia with hemoglobin of 11.7, platelets of 113,000.  Leukopenia which has resolved now.  Noted to have mild LFT elevations.  Not related to [place] in [place].  Denies having any insect bites.     Some fatigue from her extensive travel and work for the last 6 weeks however there is no unintentional weight loss or loss of appetite.  She has been at her baseline and able to do her normal activities.      Past Medical History:  Past Medical History:    Anxiety    Lactose intolerance       Past Surgical History:   Procedure Laterality Date    Cholecystectomy  2013       Home Medications:  Medications Ordered Prior to Encounter[1]    Current Inpatient Medications:  Inpatient Meds:   escitalopram  10 mg Oral Daily with breakfast    melatonin  5 mg Oral Nightly    spironolactone  200 mg Oral Daily    acetaminophen             PRN Meds:    polyethylene glycol (PEG 3350)    sennosides    bisacodyl    ondansetron    prochlorperazine    acetaminophen    acetaminophen    Allergies:   Allergies[2]    Psychosocial History:  Social History     Social History Narrative    Not on file     Social History      Socioeconomic History    Marital status:     Number of children: 0   Occupational History    Occupation: investments   Tobacco Use    Smoking status: Some Days     Types: Cigarettes    Smokeless tobacco: Never    Tobacco comments:     Once in a while   Vaping Use    Vaping status: Never Used   Substance and Sexual Activity    Alcohol use: Not Currently     Comment: socially    Drug use: No    Sexual activity: Yes     Partners: Male     Social Drivers of Health     Food Insecurity: No Food Insecurity (10/25/2024)    Food Insecurity     Food Insecurity: Never true   Transportation Needs: No Transportation Needs (10/25/2024)    Transportation Needs     Lack of Transportation: No   Housing Stability: Low Risk  (10/25/2024)    Housing Stability     Housing Instability: No       Family Medical History:  Family History   Problem Relation Age of Onset    Other (Other) Mother         thyroid tumor       Review of Systems:  A 10-point ROS was done with pertinent positives and negative per the HPI    Vital Signs:  Height: 170.2 cm (5' 7\") (10/25 1535)  Weight: 75.3 kg (166 lb) (10/25 2024)  BSA (Calculated - sq m): 1.87 sq meters (10/25 1535)  Pulse: 75 (10/26 0300)  BP: 91/48 (10/26 0624)  Temp: 97.9 °F (36.6 °C) (10/26 0624)  Do Not Use - Resp Rate: --  SpO2: 100 % (10/26 0624)      Wt Readings from Last 6 Encounters:   10/25/24 75.3 kg (166 lb)   10/21/24 70.3 kg (155 lb)   01/13/20 90.7 kg (200 lb)   09/25/19 87.5 kg (193 lb)   07/23/19 88.9 kg (196 lb)   07/02/19 88.5 kg (195 lb)       ECOG PS: 0    Physical Examination:  General: Patient is alert and oriented, not in acute distress  Psych: Mood and affect are appropriate  Eyes: EOMI, PERRL  ENT: Oropharynx is clear, no adenopathy  CV: Regular rate and rhythm, normal S1S2, no murmurs, no LE edema  Respiratory: Lungs clear to auscultation bilaterally  GI/Abd: Soft, non-tender with normoactive bowel sounds, no hepatosplenomegaly  Neurological: Grossly intact    Lymphatics: No palpable cervical, supraclavicular, axillary, or inguinal lymphadenopathy  Skin: no rashes or petechiae      Laboratory:  Recent Labs   Lab 10/21/24  0921 10/25/24  1727   WBC 2.0* 4.1   HGB 13.9 11.7*   HCT 40.8 34.4*   .0* 113.0*   MCV 88.5 88.7   RDW 12.6 12.9   NEPRELIM 1.36* 2.25       Recent Labs   Lab 10/21/24  0921 10/25/24  1727   * 141   K 3.7 4.1    110   CO2 25.0 25.0   BUN 8* 6*   CREATSERUM 0.68 0.59   GLU 95 93   CA 8.6* 8.1*   TP  --  5.9   ALB  --  3.6   ALKPHO  --  137*   AST  --  195*   ALT  --  344*   BILT  --  0.3       Recent Labs   Lab 10/25/24  1727   INR 1.04   PTT 32.7       Imaging:    Not available    Impression & Plan:     34-year-old female presenting with mild anemia and thrombocytopenia in the setting of fevers and elevated liver function.    Anemia and thrombocytopenia are most likely reactive so secondary to an infectious or autoimmune etiology.  White count is normal.  Low suspicion for leukemic process.  However will complete the workup including getting a panel, vitamin B12, folate, LDH, haptoglobin, DIC panel along with the HIV, JUVENCIO    Appreciate ID input.     Repeat CBC tomorrow.        Zaida Thrasher MD  Hematology/Medical Oncology              [1]   Current Facility-Administered Medications on File Prior to Encounter   Medication Dose Route Frequency Provider Last Rate Last Admin    [COMPLETED] sodium chloride 0.9 % IV bolus 1,000 mL  1,000 mL Intravenous Once Madalyn Bryan MD   Stopped at 10/21/24 1102    [COMPLETED] metoclopramide (Reglan) 5 mg/mL injection 10 mg  10 mg Intravenous Once Madalyn Bryan MD   10 mg at 10/21/24 1018    [COMPLETED] diphenhydrAMINE (Benadryl) 50 mg/mL  injection 25 mg  25 mg Intravenous Once Madalyn Bryan MD   25 mg at 10/21/24 1018    [COMPLETED] sodium chloride 0.9 % IV bolus 1,000 mL  1,000 mL Intravenous Once Madalyn Bryan MD   Stopped at 10/21/24 1202    [COMPLETED] ketorolac  (Toradol) 15 MG/ML injection 15 mg  15 mg Intravenous Once Madalyn Bryan MD   15 mg at 10/21/24 1101     Current Outpatient Medications on File Prior to Encounter   Medication Sig Dispense Refill    escitalopram 10 MG Oral Tab Take 1 tablet (10 mg total) by mouth daily with breakfast.      spironolactone 100 MG Oral Tab Take 2 tablets (200 mg total) by mouth daily.      Levonorgestrel (MIRENA, 52 MG,) 20 MCG/DAY Intrauterine IUD 20 mcg (1 each total) by Intrauterine route once.      melatonin 5 MG Oral Cap Take 1 capsule (5 mg total) by mouth nightly.      semaglutide 2 MG/3ML Subcutaneous Solution Pen-injector Inject 0.25 mg into the skin once a week.      fluticasone propionate 50 MCG/ACT Nasal Suspension 2 sprays by Nasal route daily. (Patient not taking: Reported on 10/25/2024) 16 g 0    amoxicillin clavulanate 875-125 MG Oral Tab Take 1 tablet by mouth 2 (two) times daily for 7 days. (Patient not taking: Reported on 10/25/2024) 14 tablet 0    FLUTICASONE PROPIONATE 50 MCG/ACT Nasal Suspension SPRAY 2 SPRAYS INTO EACH NOSTRIL EVERY DAY (Patient not taking: Reported on 10/25/2024) 1 Inhaler 1    azithromycin 250 MG Oral Tab Take 2 tablets today by mouth, then 1 tablet daily. (Patient not taking: Reported on 10/25/2024) 6 tablet 0    Amoxicillin-Pot Clavulanate (AUGMENTIN) 875-125 MG Oral Tab Take 1 tablet by mouth 2 (two) times daily. (Patient not taking: Reported on 10/25/2024) 14 tablet 0    Albuterol Sulfate  (90 Base) MCG/ACT Inhalation Aero Soln Inhale 2 puffs into the lungs every 6 (six) hours as needed for Wheezing. (Patient not taking: Reported on 10/25/2024) 1 Inhaler 1   [2]   Allergies  Allergen Reactions    Lactose Intolerance NAUSEA AND VOMITING and SWELLING

## 2024-10-26 NOTE — PLAN OF CARE
Patient is A/Ox4. On RA. Independent. Voiding freely. PRN Fioricet given for headache. No complaints of nausea. Tolerating general diet. Call light and personal items within reach.  at bedside. Infectious vs autoimmune workup being done. Plan pending.      Problem: PAIN - ADULT  Goal: Verbalizes/displays adequate comfort level or patient's stated pain goal  Description: INTERVENTIONS:  - Encourage pt to monitor pain and request assistance  - Assess pain using appropriate pain scale  - Administer analgesics based on type and severity of pain and evaluate response  - Implement non-pharmacological measures as appropriate and evaluate response  - Consider cultural and social influences on pain and pain management  - Manage/alleviate anxiety  - Utilize distraction and/or relaxation techniques  - Monitor for opioid side effects  - Notify MD/LIP if interventions unsuccessful or patient reports new pain  - Anticipate increased pain with activity and pre-medicate as appropriate  Outcome: Progressing     Problem: GASTROINTESTINAL - ADULT  Goal: Minimal or absence of nausea and vomiting  Description: INTERVENTIONS:  - Maintain adequate hydration with IV or PO as ordered and tolerated  - Nasogastric tube to low intermittent suction as ordered  - Evaluate effectiveness of ordered antiemetic medications  - Provide nonpharmacologic comfort measures as appropriate  - Advance diet as tolerated, if ordered  - Obtain nutritional consult as needed  - Evaluate fluid balance  Outcome: Progressing  Goal: Maintains or returns to baseline bowel function  Description: INTERVENTIONS:  - Assess bowel function  - Maintain adequate hydration with IV or PO as ordered and tolerated  - Evaluate effectiveness of GI medications  - Encourage mobilization and activity  - Obtain nutritional consult as needed  - Establish a toileting routine/schedule  - Consider collaborating with pharmacy to review patient's medication profile  Outcome:  Progressing     Problem: HEMATOLOGIC - ADULT  Goal: Maintains hematologic stability  Description: INTERVENTIONS  - Assess for signs and symptoms of bleeding or hemorrhage  - Monitor labs and vital signs for trends  - Administer supportive blood products/factors, fluids and medications as ordered and appropriate  - Administer supportive blood products/factors as ordered and appropriate  Outcome: Progressing     Problem: MUSCULOSKELETAL - ADULT  Goal: Return mobility to safest level of function  Description: INTERVENTIONS:  - Assess patient stability and activity tolerance for standing, transferring and ambulating w/ or w/o assistive devices  - Assist with transfers and ambulation using safe patient handling equipment as needed  - Ensure adequate protection for wounds/incisions during mobilization  - Obtain PT/OT consults as needed  - Advance activity as appropriate  - Communicate ordered activity level and limitations with patient/family  Outcome: Progressing     Problem: Impaired Activities of Daily Living  Goal: Achieve highest/safest level of independence in self care  Description: Interventions:  - Assess ability and encourage patient to participate in ADLs to maximize function  - Promote sitting position while performing ADLs such as feeding, grooming, and bathing  - Educate and encourage patient/family in tolerated functional activity level and precautions during self-care  Outcome: Progressing

## 2024-10-26 NOTE — ED QUICK NOTES
Patient stable for transfer to floor at this time. A&Ox4, skin p/w/d. Denies cp/jason. Pain managed. Iv site patent and intact. All belongings accompanying patient to floor.

## 2024-10-26 NOTE — H&P
[facility]  part of [facility] Hospitalist H&P     CC:   Chief Complaint   Patient presents with    Abnormal Labs        PCP: London Michel MD      Assessment and Plan       Bee Luna is a 34 year old female with PMH sig for anxiety, prior lap bahman who has had several ER and clinic visits for fevers and mild pancytopenia.  Saw Heme on 10/23 and  reported multiple sx including fevfesr, HA, mylagias, chills, and dizzinesss.  Infectious work up so far neg.  Did have WBC 2 Hgb 13.9 Plts 121, ANC 1360,  Differential %'s normal, peripheral smear reviewed by pathology: mild neutropenia, mild lymphocytopenia, mild thrombocytopenia, neutrophils with reactive features such as cytoplasmic vacuoles, lymphocytes appear mature and unremarkable, plts slightly decreased, no platelet clumping or circulating blasts seen.   Was put on augmentin without improvement.  Flow cytometry sent, was trying to get into [facility] for earlier appointment.  Came into er due to persistent sx.  Also feels abx made her hands and feet and face swell althought exam not remarkable, no hives.  No other rashes.  Pt feels better after HA cocktail.  LFTs are also elevated.  Plan for heme and ID work up.      Mild pancytopenia  -wbc was 2 as oupt, now 4, plts still low around 110  -hgb 11.7  -flow cytometry outpt pending  -Follow up heme recs    Intermittent fevers  -afebrile here, RVP neg  -blood cultrues drawn  -ESR nromal,. CRp mildly elevated, lactic normal   -monitor off abx for now,. ID consult  -see lfts below    Elevated LFTs   -ouytpt ap 127, ast 121, alt 197  -repeat ap 137, ,   - will get US     FEN: IVF prn, lytes in AM, gen diet    PPX; SCD, Monitor plts    Dipso full code pending course    Outpatient records reviewed confirming patient's medical history and medications.     Further recommendations pending patient's clinical course.  [facility] hospitalist to continue to follow patient while in  house    Patient and/or patient's family given opportunity to ask questions and note understanding and agreeing with therapeutic plan as outlined    Thank You,  Kaia Allen MD  [facility] [place]     Answering Service number: [phone]    HPI     Bee Luna is a 34 year old female with PMH sig for anxiety, prior lap bahman who has had several ER and clinic visits for fevers and mild pancytopenia.  Saw Heme on 10/23 and  reported multiple sx including fevfesr, HA, mylagias, chills, and dizzinesss.  Infectious work up so far neg.  Did have WBC 2 Hgb 13.9 Plts 121, ANC 1360,  Differential %'s normal, peripheral smear reviewed by pathology: mild neutropenia, mild lymphocytopenia, mild thrombocytopenia, neutrophils with reactive features such as cytoplasmic vacuoles, lymphocytes appear mature and unremarkable, plts slightly decreased, no platelet clumping or circulating blasts seen.   Was put on augmentin without improvement.  Flow cytometry sent, was trying to get into [facility] for earlier appointment.  Came into er due to persistent sx.  Also feels abx made her hands and feet and face swell althought exam not remarkable, no hives.  No other rashes.  Pt feels better after HA cocktail.  LFTs are also elevated.  Plan for heme and ID work up.      Dw patient in agreement.  Sx have been fluctutaing for over 1 week.  No other sick contacts.  No Cp or SOB.  Afebrile here. Repeat Wbc IS 4, PLTs overall stable.     Review of Systems  12 point systems reviewed, please see HPI for pertinent positives, otherwise negative    History     PMH  Past Medical History:    Anxiety    Lactose intolerance        PSH  Past Surgical History:   Procedure Laterality Date    Cholecystectomy  2013        ALL:  Allergies[1]     Home Medications:  Medications Taking[2]    Soc Hx  Social History     Tobacco Use    Smoking status: Some Days     Types: Cigarettes    Smokeless tobacco: Never    Tobacco comments:     Once in a while    Substance Use Topics    Alcohol use: Not Currently     Comment: socially        Fam Hx  Family History   Problem Relation Age of Onset    Other (Other) Mother         thyroid tumor           Objective     Temp: 98.2 °F (36.8 °C)  Pulse: 80  Resp: 15  BP: 97/58    Exam  Gen: No acute distress, alert and oriented x3  Neck Supple, no JVD  Pulm: Lungs clear, normal respiratory effort, No wheezing or crackles  CV: Heart with regular rate and rhythm, No murmurs, rubs, gallops  Abd: Abdomen soft, nontender, nondistended, no organomegaly, bowel sounds present  MSK: Full range of motion in extremities, no clubbing, no cyanosis.  No Lower extremity edema  Skin: no rashes or lesions, well perfused  Psych: mood stable, cooperative  Neuro: No focal deficits    Diagnostic Data:    CBC/Chem  Recent Labs   Lab 10/21/24  0921 10/25/24  1727   WBC 2.0* 4.1   HGB 13.9 11.7*   MCV 88.5 88.7   .0* 113.0*   INR  --  1.04       Recent Labs   Lab 10/21/24  0921 10/25/24  1727   * 141   K 3.7 4.1    110   CO2 25.0 25.0   BUN 8* 6*   CREATSERUM 0.68 0.59   GLU 95 93   CA 8.6* 8.1*       Recent Labs   Lab 10/25/24  1727   *   *   ALB 3.6       No results for input(s): \"TROP\" in the last 168 hours.       Radiology: No results found.              [1]   Allergies  Allergen Reactions    Lactose Intolerance NAUSEA AND VOMITING and SWELLING   [2]   Outpatient Medications Marked as Taking for the 10/25/24 encounter (Hospital Encounter)   Medication Sig Dispense Refill    escitalopram 10 MG Oral Tab Take 1 tablet (10 mg total) by mouth daily with breakfast.      spironolactone 100 MG Oral Tab Take 2 tablets (200 mg total) by mouth daily.      Levonorgestrel (MIRENA, 52 MG,) 20 MCG/DAY Intrauterine IUD 20 mcg (1 each total) by Intrauterine route once.      melatonin 5 MG Oral Cap Take 1 capsule (5 mg total) by mouth nightly.      semaglutide 2 MG/3ML Subcutaneous Solution Pen-injector Inject 0.25 mg into the skin  once a week.

## 2024-10-27 LAB
ALBUMIN SERPL-MCNC: 3.5 G/DL (ref 3.2–4.8)
ALBUMIN/GLOB SERPL: 1.8 {RATIO} (ref 1–2)
ALP LIVER SERPL-CCNC: 133 U/L
ALT SERPL-CCNC: 389 U/L
ANION GAP SERPL CALC-SCNC: 5 MMOL/L (ref 0–18)
AST SERPL-CCNC: 212 U/L (ref ?–34)
BASOPHILS # BLD AUTO: 0.01 X10(3) UL (ref 0–0.2)
BASOPHILS # BLD AUTO: 0.01 X10(3) UL (ref 0–0.2)
BASOPHILS NFR BLD AUTO: 0.2 %
BASOPHILS NFR BLD AUTO: 0.3 %
BILIRUB SERPL-MCNC: 0.3 MG/DL (ref 0.3–1.2)
BUN BLD-MCNC: 7 MG/DL (ref 9–23)
BUN/CREAT SERPL: 11.3 (ref 10–20)
CALCIUM BLD-MCNC: 8 MG/DL (ref 8.7–10.4)
CHLORIDE SERPL-SCNC: 107 MMOL/L (ref 98–112)
CO2 SERPL-SCNC: 27 MMOL/L (ref 21–32)
CREAT BLD-MCNC: 0.62 MG/DL
DEPRECATED RDW RBC AUTO: 41 FL (ref 35.1–46.3)
DEPRECATED RDW RBC AUTO: 42.5 FL (ref 35.1–46.3)
EGFRCR SERPLBLD CKD-EPI 2021: 121 ML/MIN/1.73M2 (ref 60–?)
EOSINOPHIL # BLD AUTO: 0.06 X10(3) UL (ref 0–0.7)
EOSINOPHIL # BLD AUTO: 0.07 X10(3) UL (ref 0–0.7)
EOSINOPHIL NFR BLD AUTO: 1.2 %
EOSINOPHIL NFR BLD AUTO: 1.8 %
ERYTHROCYTE [DISTWIDTH] IN BLOOD BY AUTOMATED COUNT: 12.8 % (ref 11–15)
ERYTHROCYTE [DISTWIDTH] IN BLOOD BY AUTOMATED COUNT: 12.8 % (ref 11–15)
GLOBULIN PLAS-MCNC: 2 G/DL (ref 2–3.5)
GLUCOSE BLD-MCNC: 86 MG/DL (ref 70–99)
HCT VFR BLD AUTO: 31.6 %
HCT VFR BLD AUTO: 34.6 %
HGB BLD-MCNC: 10.9 G/DL
HGB BLD-MCNC: 11.7 G/DL
IMM GRANULOCYTES # BLD AUTO: 0.02 X10(3) UL (ref 0–1)
IMM GRANULOCYTES # BLD AUTO: 0.03 X10(3) UL (ref 0–1)
IMM GRANULOCYTES NFR BLD: 0.4 %
IMM GRANULOCYTES NFR BLD: 0.8 %
LYMPHOCYTES # BLD AUTO: 1.51 X10(3) UL (ref 1–4)
LYMPHOCYTES # BLD AUTO: 1.65 X10(3) UL (ref 1–4)
LYMPHOCYTES NFR BLD AUTO: 29.4 %
LYMPHOCYTES NFR BLD AUTO: 43.1 %
MAGNESIUM SERPL-MCNC: 1.8 MG/DL (ref 1.6–2.6)
MCH RBC QN AUTO: 29.9 PG (ref 26–34)
MCH RBC QN AUTO: 30.9 PG (ref 26–34)
MCHC RBC AUTO-ENTMCNC: 33.8 G/DL (ref 31–37)
MCHC RBC AUTO-ENTMCNC: 34.5 G/DL (ref 31–37)
MCV RBC AUTO: 86.8 FL
MCV RBC AUTO: 91.3 FL
MONOCYTES # BLD AUTO: 0.16 X10(3) UL (ref 0.1–1)
MONOCYTES # BLD AUTO: 0.27 X10(3) UL (ref 0.1–1)
MONOCYTES NFR BLD AUTO: 4.2 %
MONOCYTES NFR BLD AUTO: 5.3 %
NEUTROPHILS # BLD AUTO: 1.91 X10 (3) UL (ref 1.5–7.7)
NEUTROPHILS # BLD AUTO: 1.91 X10(3) UL (ref 1.5–7.7)
NEUTROPHILS # BLD AUTO: 3.27 X10 (3) UL (ref 1.5–7.7)
NEUTROPHILS # BLD AUTO: 3.27 X10(3) UL (ref 1.5–7.7)
NEUTROPHILS NFR BLD AUTO: 49.8 %
NEUTROPHILS NFR BLD AUTO: 63.5 %
OSMOLALITY SERPL CALC.SUM OF ELEC: 285 MOSM/KG (ref 275–295)
PHOSPHATE SERPL-MCNC: 3 MG/DL (ref 2.4–5.1)
PLATELET # BLD AUTO: 174 10(3)UL (ref 150–450)
PLATELET # BLD AUTO: 203 10(3)UL (ref 150–450)
POTASSIUM SERPL-SCNC: 4 MMOL/L (ref 3.5–5.1)
PROT SERPL-MCNC: 5.5 G/DL (ref 5.7–8.2)
RBC # BLD AUTO: 3.64 X10(6)UL
RBC # BLD AUTO: 3.79 X10(6)UL
SODIUM SERPL-SCNC: 139 MMOL/L (ref 136–145)
WBC # BLD AUTO: 3.8 X10(3) UL (ref 4–11)
WBC # BLD AUTO: 5.1 X10(3) UL (ref 4–11)

## 2024-10-27 RX ORDER — MAGNESIUM OXIDE 400 MG/1
400 TABLET ORAL ONCE
Status: COMPLETED | OUTPATIENT
Start: 2024-10-27 | End: 2024-10-27

## 2024-10-27 RX ORDER — HEPARIN SODIUM 5000 [USP'U]/ML
5000 INJECTION, SOLUTION INTRAVENOUS; SUBCUTANEOUS EVERY 8 HOURS SCHEDULED
Status: DISPENSED | OUTPATIENT
Start: 2024-10-27 | End: 2024-10-28

## 2024-10-27 NOTE — PLAN OF CARE
Pt HA relieved with fioricet. Up self, voiding freely. SL. Afebrile overnight. Monitoring labs. Tolerating general diet. Safety precautions in place.     Problem: Patient Centered Care  Goal: Patient preferences are identified and integrated in the patient's plan of care  Description: Interventions:  - What would you like us to know as we care for you?   - Provide timely, complete, and accurate information to patient/family  - Incorporate patient and family knowledge, values, beliefs, and cultural backgrounds into the planning and delivery of care  - Encourage patient/family to participate in care and decision-making at the level they choose  - Honor patient and family perspectives and choices  Outcome: Progressing     Problem: PAIN - ADULT  Goal: Verbalizes/displays adequate comfort level or patient's stated pain goal  Description: INTERVENTIONS:  - Encourage pt to monitor pain and request assistance  - Assess pain using appropriate pain scale  - Administer analgesics based on type and severity of pain and evaluate response  - Implement non-pharmacological measures as appropriate and evaluate response  - Consider cultural and social influences on pain and pain management  - Manage/alleviate anxiety  - Utilize distraction and/or relaxation techniques  - Monitor for opioid side effects  - Notify MD/LIP if interventions unsuccessful or patient reports new pain  - Anticipate increased pain with activity and pre-medicate as appropriate  Outcome: Progressing

## 2024-10-27 NOTE — PROGRESS NOTES
[facility]  part of [facility] Infectious Disease  Progress Note    Bee Luna Patient Status:  Inpatient    1990 MRN [id number]   Location [facility] 4W/SW/SE Attending Kaia Allen MD   Hosp Day # 2 PCP London Michel MD     Subjective:  Patient seen/examined.  Up in bed in NAD.  Overnight events reviewed.  No fevers.  HA ongoing.  Gets some improvement with fioricet but then it returns.  No neck pain.  CT without acute findings.    Objective:  Blood pressure 94/56, pulse 77, temperature 98.7 °F (37.1 °C), temperature source Oral, resp. rate 16, height 5' 7\" (1.702 m), weight 166 lb (75.3 kg), last menstrual period 10/02/2024, SpO2 100%, not currently breastfeeding.    Intake/Output:    Intake/Output Summary (Last 24 hours) at 10/27/2024 0954  Last data filed at 10/27/2024 0840  Gross per 24 hour   Intake 2160 ml   Output --   Net 2160 ml     Physical Exam:  General: Awake, alert, non-tox, NAD.  HEENT:  Oropharynx clear, trachea ML.  Heart: RRR S1S2 no murmurs.  Lungs: Essentially CTA b/l, no rhonchi, rales, wheezes.  Abdomen: Soft, NT/ND.  BS present.  No organomegaly.  Extremity: No edema.  Neurological: No focal deficits.  Derm:  Warm, dry, free from rashes.    Lab Data Review:  Lab Results   Component Value Date    WBC 5.1 10/27/2024    HGB 10.9 10/27/2024    HCT 31.6 10/27/2024    .0 10/27/2024    CREATSERUM 0.62 10/27/2024    BUN 7 10/27/2024     10/27/2024    K 4.0 10/27/2024     10/27/2024    CO2 27.0 10/27/2024    GLU 86 10/27/2024    CA 8.0 10/27/2024    ALB 3.5 10/27/2024    ALKPHO 133 10/27/2024    BILT 0.3 10/27/2024    TP 5.5 10/27/2024     10/27/2024     10/27/2024    MG 1.8 10/27/2024    PHOS 3.0 10/27/2024      Cultures:   Blood cultures negative  RVP negative    Hepatitis panel negative  HIV negative    Radiology:  CONCLUSION:      No acute cardiopulmonary abnormality.       Assessment and  Plan:     Syndrome of HA, fever, myalgias and leukopenia leading to several ED and outpatient evaluations in recent weeks  - Flow cytometry sent and plans for tertiary care center evaluation  - WBCs coming up, LFTs elevated  - PCT negative  - Observing off antibiotics     2.  Transaminitis ?viral process leading to the above  - RVP negative  - Tests pending for chikungunua, dengue, EBV, CMV, parvo, Lyme, WNV, JUVENCIO     3.  Intractable headache  - Appears to be symptomatically improving with fioricet  - LP a consideration if we do not get answers from the above and headaches persist     4.  Disposition -inpatient.  Supportive care ongoing.  Multiple viral studies and atypical pathogen studies remain pending as above.  WBCs have normalized, LFTs remain elevated.  Observe off antibiotics.  Supportive care ongoing.  If headaches persist and no etiology of events found, will need LP tomorrow.  D/w patient at length.  Will follow.    Asiya Cassidy DO, [facility] Infectious Disease  [phone]    10/27/2024  9:54 AM

## 2024-10-27 NOTE — PLAN OF CARE
Pt A/O x4, VSS. Fioricet for H/A. Tolerating diet. Mag replaced per protocol. Miralax for constipation. No calls from tele. Up ad kiesha, voiding freely. Fall precautions in place. Call light within reach. Calls appropriately. Frequent rounding. Possible LP tomorrow.     Problem: Patient Centered Care  Goal: Patient preferences are identified and integrated in the patient's plan of care  Description: Interventions:  - What would you like us to know as we care for you?   - Provide timely, complete, and accurate information to patient/family  - Incorporate patient and family knowledge, values, beliefs, and cultural backgrounds into the planning and delivery of care  - Encourage patient/family to participate in care and decision-making at the level they choose  - Honor patient and family perspectives and choices  Outcome: Progressing     Problem: PAIN - ADULT  Goal: Verbalizes/displays adequate comfort level or patient's stated pain goal  Description: INTERVENTIONS:  - Encourage pt to monitor pain and request assistance  - Assess pain using appropriate pain scale  - Administer analgesics based on type and severity of pain and evaluate response  - Implement non-pharmacological measures as appropriate and evaluate response  - Consider cultural and social influences on pain and pain management  - Manage/alleviate anxiety  - Utilize distraction and/or relaxation techniques  - Monitor for opioid side effects  - Notify MD/LIP if interventions unsuccessful or patient reports new pain  - Anticipate increased pain with activity and pre-medicate as appropriate  Outcome: Progressing     Problem: GASTROINTESTINAL - ADULT  Goal: Minimal or absence of nausea and vomiting  Description: INTERVENTIONS:  - Maintain adequate hydration with IV or PO as ordered and tolerated  - Nasogastric tube to low intermittent suction as ordered  - Evaluate effectiveness of ordered antiemetic medications  - Provide nonpharmacologic comfort measures as  appropriate  - Advance diet as tolerated, if ordered  - Obtain nutritional consult as needed  - Evaluate fluid balance  Outcome: Progressing  Goal: Maintains or returns to baseline bowel function  Description: INTERVENTIONS:  - Assess bowel function  - Maintain adequate hydration with IV or PO as ordered and tolerated  - Evaluate effectiveness of GI medications  - Encourage mobilization and activity  - Obtain nutritional consult as needed  - Establish a toileting routine/schedule  - Consider collaborating with pharmacy to review patient's medication profile  Outcome: Progressing     Problem: HEMATOLOGIC - ADULT  Goal: Maintains hematologic stability  Description: INTERVENTIONS  - Assess for signs and symptoms of bleeding or hemorrhage  - Monitor labs and vital signs for trends  - Administer supportive blood products/factors, fluids and medications as ordered and appropriate  - Administer supportive blood products/factors as ordered and appropriate  Outcome: Progressing     Problem: MUSCULOSKELETAL - ADULT  Goal: Return mobility to safest level of function  Description: INTERVENTIONS:  - Assess patient stability and activity tolerance for standing, transferring and ambulating w/ or w/o assistive devices  - Assist with transfers and ambulation using safe patient handling equipment as needed  - Ensure adequate protection for wounds/incisions during mobilization  - Obtain PT/OT consults as needed  - Advance activity as appropriate  - Communicate ordered activity level and limitations with patient/family  Outcome: Progressing     Problem: Impaired Activities of Daily Living  Goal: Achieve highest/safest level of independence in self care  Description: Interventions:  - Assess ability and encourage patient to participate in ADLs to maximize function  - Promote sitting position while performing ADLs such as feeding, grooming, and bathing  - Educate and encourage patient/family in tolerated functional activity level and  precautions during self-care    Outcome: Progressing     Problem: SAFETY ADULT - FALL  Goal: Free from fall injury  Description: INTERVENTIONS:  - Assess pt frequently for physical needs  - Identify cognitive and physical deficits and behaviors that affect risk of falls.  - [place] fall precautions as indicated by assessment.  - Educate pt/family on patient safety including physical limitations  - Instruct pt to call for assistance with activity based on assessment  - Modify environment to reduce risk of injury  - Provide assistive devices as appropriate  - Consider OT/PT consult to assist with strengthening/mobility  - Encourage toileting schedule  Outcome: Progressing     Problem: DISCHARGE PLANNING  Goal: Discharge to home or other facility with appropriate resources  Description: INTERVENTIONS:  - Identify barriers to discharge w/pt and caregiver  - Include patient/family/discharge partner in discharge planning  - Arrange for needed discharge resources and transportation as appropriate  - Identify discharge learning needs (meds, wound care, etc)  - Arrange for interpreters to assist at discharge as needed  - Consider post-discharge preferences of patient/family/discharge partner  - Complete POLST form as appropriate  - Assess patient's ability to be responsible for managing their own health  - Refer to Case Management Department for coordinating discharge planning if the patient needs post-hospital services based on physician/LIP order or complex needs related to functional status, cognitive ability or social support system  Outcome: Progressing

## 2024-10-27 NOTE — PROGRESS NOTES
[facility]  part of [facility]     [facility] Hospitalist Progress Note     PCP: London Michel MD    CC: Follow up       Assessment/Plan:     Bee Luna is a 34 year old female with PMH sig for anxiety, prior lap bahman who has had several ER and clinic visits for fevers and mild pancytopenia.  Saw Heme on 10/23 and  reported multiple sx including fevfesr, HA, mylagias, chills, and dizzinesss.  Infectious work up so far neg.  Did have WBC 2 Hgb 13.9 Plts 121, ANC 1360,  Differential %'s normal, peripheral smear reviewed by pathology: mild neutropenia, mild lymphocytopenia, mild thrombocytopenia, neutrophils with reactive features such as cytoplasmic vacuoles, lymphocytes appear mature and unremarkable, plts slightly decreased, no platelet clumping or circulating blasts seen.   Was put on augmentin without improvement.  Flow cytometry sent, was trying to get into [facility] for earlier appointment.  Came into er due to persistent sx.  Also feels abx made her hands and feet and face swell althought exam not remarkable, no hives.  No other rashes.  HA improved with fiocet but recurs.  So far afebrile, counts improving.  Possible LP tomorrow if HA persistent,  Infectious work up penidng,  Rheum consult.       Mild pancytopenia  -wbc was 2 as oupt, now 4, plts still low around 110  -hgb 11.7  -flow cytometry outpt pending  -Follow up heme recs  -Hemoglobin 11->10.9, Plts and wbc now normalizing     Intermittent fevers  -afebrile here, RVP neg  -blood cultrues drawn  -ESR nromal,. CRp mildly elevated, lactic normal   -monitor off abx for now,. ID consult  -see lfts below  -Ferritin markedly elevated.  No signs of hemolysis.     Elevated LFTs   -ouytpt ap 127, ast 121, alt 197  -repeat ap 137, , , mild uptick again today, repeat in a.m.  - US normal  -Acute hepatitis panel negative.    Diffuse joint pain, b/l  Likely part of viral prodrome, patient concerned for possible autoimmune  process.  Will ask rheumatology to evaluate but discussed with her that empiric steroids would not be advised at this time and have to avoid NSAIDs if LP needed.     FEN: IVF prn, lytes in AM, gen diet     PPX; SCD, plts normalzied, HSQ x2, hold then for possible LP in AM     Dipso full code pending course    Questions/concerns were discussed with patient and/or family by bedside.    Note: This chart was prepared using voice recognition software and may contain unintended word substitution errors.     Discussed with  at bedside      Thank You,  Kaia Allen M.D.  [facility] Hospitalist  Answering Service: [phone]        Subjective     Fioricet helps but the headaches do come back when it wears off.  Diffuse joint pain hands feet and hips.  Sometimes knees.  No new rashes.  Afebrile overnight no CP, SOB, or N/V.      Objective     OBJECTIVE:  Temp:  [97.8 °F (36.6 °C)-98.7 °F (37.1 °C)] 98.2 °F (36.8 °C)  Pulse:  [71-83] 71  Resp:  [16-18] 18  BP: (89-94)/(55-60) 90/56  SpO2:  [99 %-100 %] 100 %    Intake/Output:    Intake/Output Summary (Last 24 hours) at 10/27/2024 1622  Last data filed at 10/27/2024 1410  Gross per 24 hour   Intake 2180 ml   Output --   Net 2180 ml       Last 3 Weights   10/25/24 2024 166 lb (75.3 kg)   10/25/24 1535 166 lb (75.3 kg)   10/21/24 0913 155 lb (70.3 kg)   01/13/20 1606 200 lb (90.7 kg)       Exam  Gen: No acute distress, alert and oriented x3  Neck Supple, no JVD  Pulm: Lungs clear, normal respiratory effort, No wheezing or crackles  CV: Heart with regular rate and rhythm, No murmurs, rubs, gallops  Abd: Abdomen soft, nontender, nondistended, no organomegaly, bowel sounds present  MSK:  no clubbing, no cyanosis.  No Lower extremity edema  Skin: no rashes or lesions, well perfused  Psych: mood stable, cooperative  Neuro: no focal deficits    Medications      escitalopram  10 mg Oral Daily with breakfast    melatonin  5 mg Oral Nightly    [Held by provider]  spironolactone  200 mg Oral Daily         butalbital-acetaminophen-caffeine    polyethylene glycol (PEG 3350)    sennosides    bisacodyl    ondansetron    prochlorperazine    acetaminophen    Data Review:       Labs:     Recent Labs   Lab 10/21/24  0921 10/25/24  1727 10/26/24  0903 10/27/24  0717   WBC 2.0* 4.1 4.9 5.1   HGB 13.9 11.7* 11.0* 10.9*   MCV 88.5 88.7 91.3 86.8   .0* 113.0* 125.0* 174.0   INR  --  1.04 1.05  --        Recent Labs   Lab 10/21/24  0921 10/25/24  1727 10/26/24  0903 10/27/24  0717   * 141 138 139   K 3.7 4.1 3.7 4.0    110 107 107   CO2 25.0 25.0 25.0 27.0   BUN 8* 6* 7* 7*   CREATSERUM 0.68 0.59 0.55 0.62   CA 8.6* 8.1* 7.9* 8.0*   MG  --   --   --  1.8   PHOS  --   --   --  3.0   GLU 95 93 83 86       Recent Labs   Lab 10/25/24  1727 10/26/24  0903 10/27/24  0717   * 376* 389*   * 222* 212*   ALB 3.6 3.5 3.5   *  --   --        No results for input(s): \"PGLU\" in the last 168 hours.    No results for input(s): \"TROP\" in the last 168 hours.    Imaging:  US LIVER (CPT=76705)    Result Date: 10/26/2024  PROCEDURE: US LIVER (CPT=76705)  COMPARISON: None.  INDICATIONS: elevated LFTs  TECHNIQUE:   The liver was evaluated with gray scale and colorflow of the main vessels.   FINDINGS:  LIVER:   16.6 cm in length with homogeneous echogenicity.  No masses or bile duct dilatation. Color flow and Doppler imaging of portal and hepatic veins show patency and antegrade flow. BILE DUCTS:   Cholecystectomy.  Common bile duct is nondilated measures 0.3 cm. OTHER:   Visualized portions of pancreas appeared normal.  Right kidney measures 12.2 cm in length without hydronephrosis.          CONCLUSION:   Unremarkable sonographic appearance of the liver.  Cholecystectomy.     Dictated by (CST): Esa Hussein MD on 10/26/2024 at 1:01 PM     Finalized by (CST): Esa Hussein MD on 10/26/2024 at 1:02 PM          CT BRAIN OR HEAD (CPT=70450)    Result Date:  10/26/2024  PROCEDURE: CT BRAIN OR HEAD (CPT=70450)  COMPARISON: None.  INDICATIONS: Frontal and posterior headache.  TECHNIQUE: CT images were obtained without contrast material.  Automated exposure control for dose reduction was used.  Dose information is transmitted to the ACR (American College of Radiology) NRDR (National Radiology Data Registry) which includes the Dose Index Registry.  FINDINGS:  CSF SPACES: No hydrocephalus, subarachnoid hemorrhage, or effacement of the basal cisterns is appreciated. There is no extra-axial fluid collection. CEREBRUM: No acute intraparenchymal hemorrhage, edema, or cortical sulcal effacement is apparent. There is no space-occupying lesion, mass effect, or shift of midline structures. The gray-white matter junction is preserved and bilaterally symmetric in appearance. CEREBELLUM: No edema, hemorrhage, mass, or acute infarction is seen. BRAINSTEM: No edema, hemorrhage, mass, or acute infarction is seen.  CALVARIUM: There is no apparent depressed fracture, mass, or other significant visible lesion.  SINUSES: Limited views demonstrate no significant mucosal thickening or fluid.  ORBITS: Limited views are grossly unremarkable.  OTHER: Negative.         CONCLUSION:   No acute intracranial process by noncontrast CT technique.   elm-remote  Dictated by (CST): Abiodun Peraza MD on 10/26/2024 at 10:18 AM     Finalized by (CST): Abiodun Peraza MD on 10/26/2024 at 10:20 AM          XR CHEST AP PORTABLE  (CPT=71045)    Result Date: 10/25/2024  PROCEDURE: XR CHEST AP PORTABLE  (CPT=71045) TIME: 2103.   COMPARISON: None.  INDICATIONS: fever  TECHNIQUE:   Single view.   FINDINGS:  CARDIAC/VASC: Normal.  No cardiac silhouette abnormality or cardiomegaly.  Unremarkable pulmonary vasculature.  MEDIAST/JERARDO:   No visible mass or adenopathy. LUNGS/PLEURA: Normal.  No significant pulmonary parenchymal abnormalities.  No effusion or pleural thickening. BONES: No fracture or visible bony lesion.  OTHER: Negative.          CONCLUSION:   No acute cardiopulmonary abnormality.    Dictated by (CST): Felix Duggan MD on 10/25/2024 at 9:51 PM     Finalized by (CST): Felix Duggan MD on 10/25/2024 at 9:51 PM

## 2024-10-28 ENCOUNTER — APPOINTMENT (OUTPATIENT)
Dept: GENERAL RADIOLOGY | Facility: HOSPITAL | Age: 34
End: 2024-10-28
Attending: INTERNAL MEDICINE
Payer: COMMERCIAL

## 2024-10-28 PROBLEM — M25.50 POLYARTHRALGIA: Status: ACTIVE | Noted: 2024-10-28

## 2024-10-28 LAB
ALBUMIN SERPL-MCNC: 3.6 G/DL (ref 3.2–4.8)
ALBUMIN/GLOB SERPL: 1.6 {RATIO} (ref 1–2)
ALP LIVER SERPL-CCNC: 148 U/L
ALT SERPL-CCNC: 456 U/L
ANION GAP SERPL CALC-SCNC: 5 MMOL/L (ref 0–18)
AST SERPL-CCNC: 256 U/L (ref ?–34)
B BURGDOR IGG+IGM SER QL: NEGATIVE
BILIRUB SERPL-MCNC: 0.3 MG/DL (ref 0.3–1.2)
BUN BLD-MCNC: <5 MG/DL (ref 9–23)
CALCIUM BLD-MCNC: 8.4 MG/DL (ref 8.7–10.4)
CHLORIDE SERPL-SCNC: 107 MMOL/L (ref 98–112)
CO2 SERPL-SCNC: 27 MMOL/L (ref 21–32)
COLOR CSF: COLORLESS
CREAT BLD-MCNC: 0.6 MG/DL
CRYPTOCOCCAL ANTIGEN CSF: NEGATIVE
EBV NA IGG SER QL IA: POSITIVE
EBV VCA IGG SER QL IA: POSITIVE
EBV VCA IGM SER QL IA: NEGATIVE
EGFRCR SERPLBLD CKD-EPI 2021: 121 ML/MIN/1.73M2 (ref 60–?)
GLOBULIN PLAS-MCNC: 2.2 G/DL (ref 2–3.5)
GLUCOSE BLD-MCNC: 88 MG/DL (ref 70–99)
GLUCOSE CSF-MCNC: 43 MG/DL (ref 40–70)
MAGNESIUM SERPL-MCNC: 1.9 MG/DL (ref 1.6–2.6)
POTASSIUM SERPL-SCNC: 4 MMOL/L (ref 3.5–5.1)
PROT PATTERN CSF ELPH-IMP: 18.1 MG/DL (ref 15–45)
PROT SERPL-MCNC: 5.8 G/DL (ref 5.7–8.2)
RBC # CSF: 8 /CUMM (ref ?–1)
RHEUMATOID FACT SERPL-ACNC: <3.5 IU/ML (ref ?–14)
SODIUM SERPL-SCNC: 139 MMOL/L (ref 136–145)
TOTAL CELLS COUNTED CSF: 2 /CUMM (ref 0–5)
TOTAL VOLUME CSF: 11.5 ML
TUBE # CSF: 3
TURBIDITY CSF QL: CLEAR

## 2024-10-28 PROCEDURE — 009U3ZX DRAINAGE OF SPINAL CANAL, PERCUTANEOUS APPROACH, DIAGNOSTIC: ICD-10-PCS | Performed by: RADIOLOGY

## 2024-10-28 PROCEDURE — 62328 DX LMBR SPI PNXR W/FLUOR/CT: CPT | Performed by: INTERNAL MEDICINE

## 2024-10-28 PROCEDURE — 99223 1ST HOSP IP/OBS HIGH 75: CPT | Performed by: INTERNAL MEDICINE

## 2024-10-28 RX ORDER — POLYETHYLENE GLYCOL 3350 17 G/17G
17 POWDER, FOR SOLUTION ORAL DAILY
Status: DISCONTINUED | OUTPATIENT
Start: 2024-10-28 | End: 2024-10-29

## 2024-10-28 NOTE — PROGRESS NOTES
[facility]  part of [facility] Infectious Disease Consult    Bee Luna Patient Status:  Inpatient    1990 MRN [id number]   Location [facility] 4W/SW/SE Attending Cayden Howell,    Hosp Day # 3 PCP London Michel MD       Bee Luna seen and examined,   Afebrile,  Previous entries noted,  Just came back from LP  Headache is significant,   Spouse at the bed side,     History:  Past Medical History:    Anemia    Anxiety    Lactose intolerance     Past Surgical History:   Procedure Laterality Date    Cholecystectomy       Family History   Problem Relation Age of Onset    Other (Other) Mother         thyroid tumor      reports that she has been smoking cigarettes. She has never used smokeless tobacco. She reports that she does not currently use alcohol. She reports that she does not use drugs.    Allergies:  Allergies[1]    Medications:    Current Facility-Administered Medications:     polyethylene glycol (PEG 3350) (Miralax) 17 g oral packet 17 g, 17 g, Oral, Daily    sodium chloride 0.9 % IV bolus 500 mL, 500 mL, Intravenous, Once    butalbital-acetaminophen-caffeine (Fioricet) -40 MG per tab 2 tablet, 2 tablet, Oral, Q4H PRN    escitalopram (Lexapro) tab 10 mg, 10 mg, Oral, Daily with breakfast    melatonin cap/tab 5 mg, 5 mg, Oral, Nightly    [Held by provider] spironolactone (Aldactone) tab 200 mg, 200 mg, Oral, Daily    polyethylene glycol (PEG 3350) (Miralax) 17 g oral packet 17 g, 17 g, Oral, Daily PRN    sennosides (Senokot) tab 17.2 mg, 17.2 mg, Oral, Nightly PRN    bisacodyl (Dulcolax) 10 MG rectal suppository 10 mg, 10 mg, Rectal, Daily PRN    ondansetron (Zofran) 4 MG/2ML injection 4 mg, 4 mg, Intravenous, Q6H PRN    prochlorperazine (Compazine) 10 MG/2ML injection 5 mg, 5 mg, Intravenous, Q8H PRN    acetaminophen (Tylenol Extra Strength) tab 500 mg, 500 mg, Oral, Q4H PRN    Review of Systems:   Constitutional: Negative for  anorexia, chills, fatigue, fevers, malaise, night sweats and weight loss.  Eyes: Negative for visual disturbance, irritation and redness.  Ears, nose, mouth, throat, and face: Negative for hearing loss, tinnitus, nasal congestion, snoring, sore throat, hoarseness and voice change.  Respiratory: Negative for cough, sputum, hemoptysis, chest pain, wheezing, dyspnea on exertion, or stridor.  Cardiovascular: Negative for chest pain, palpitations, irregular heart beats, syncope, fatigue, orthopnea, paroxysmal nocturnal dyspnea, lower extremity edema.  Gastrointestinal: Negative for dysphagia, odynophagia, reflux symptoms, nausea, vomiting, change in bowel habits, diarrhea, constipation and abdominal pain.  Integument/breast: Negative for rash, skin lesions, and pruritus.  Hematologic/lymphatic: Negative for easy bruising, bleeding, and lymphadenopathy.  Musculoskeletal: Negative for myalgias, arthralgias, muscle weakness.  Neurological: Negative for headaches, dizziness, seizures, memory problems, trouble swallowing, speech problems, gait problems and weakness.  Behavioral/Psych: Negative for active tobacco use.  Endocrine: No history of of diabetes, thyroid disorder.  All other review of systems are negative.    Vital signs in last 24 hours:  Patient Vitals for the past 24 hrs:   BP Temp Temp src Pulse Resp SpO2   10/28/24 1115 90/54 98 °F (36.7 °C) Axillary -- 16 96 %   10/28/24 0501 90/56 98.6 °F (37 °C) Oral 81 18 97 %   10/27/24 2323 (!) 85/49 98.5 °F (36.9 °C) Oral 77 16 100 %   10/27/24 2000 -- -- -- 93 -- --   10/27/24 1639 90/58 98.5 °F (36.9 °C) Oral -- 16 100 %       Physical Exam:   General: alert, cooperative, oriented.  No respiratory distress.   Head: Normocephalic, without obvious abnormality, atraumatic.   Eyes: Conjunctivae/corneas clear.  No scleral icterus.  No conjunctival     hemorrhage.   Nose: Nares normal.   Throat: Lips, mucosa, and tongue normal.  No thrush noted.   Neck: Soft, supple neck;  trachea midline, no adenopathy, no thyromegaly.   Lungs: CTAB, normal and equal bilateral chest rise   Chest wall: No tenderness or deformity.   Heart: Regular rate and rhythm, normal S1S2, no murmur.   Abdomen: soft, non-tender, non-distended, no masses, no guarding, no     rebound, positive BS.   Extremity: no edema, no cyanosis   Skin: No rashes or lesions.   Neurological: Alert, interactive, no focal deficits    Labs:  Lab Results   Component Value Date    WBC 3.8 10/27/2024    HGB 11.7 10/27/2024    HCT 34.6 10/27/2024    .0 10/27/2024    CREATSERUM 0.60 10/28/2024    BUN <5 10/28/2024     10/28/2024    K 4.0 10/28/2024     10/28/2024    CO2 27.0 10/28/2024    GLU 88 10/28/2024    CA 8.4 10/28/2024    ALB 3.6 10/28/2024    ALKPHO 148 10/28/2024    BILT 0.3 10/28/2024    TP 5.8 10/28/2024     10/28/2024     10/28/2024    MG 1.9 10/28/2024       Radiology:  Reviewed       Cultures:  Reviewed,     Assessment and Plan:    1.  Syndrome of HA, fever, myalgias and leukopenia for last ~ 3 weeks: seen in ERs multiple times, S/P PO Augmentin for sinusitis,   -Work up with Leukopenia and abn LFTs  - Flow cytometry sent and plans for tertiary care center evaluation  - Liver US without abn,   - PCT negative  - Clinically appears to be Viral syndrome, Observing off antibiotics  - Due to headache with fever,had LP done today will follow,     2.  Transaminitis ?viral process leading to the above  - RVP negative  - EBV IgG +, PCR is negative,   - Tests pending for chikungunua, dengue, CMV, parvo, Lyme, WNV, JUVENCIO     3.  Disposition -inpatient.  A middle aged female with headache and fevers with myalgias with extensive hx of travel,   - Follow Pending cul,  - Follow LP results,   - WBC trend,   - Lfts trend,   - Observe off of abx,     Discussed with patient, spouse, all questions answered, further recommendations to follow,  nd, will need LP tomorrow.  D/w patient at length.  Will follow    Thank  you for consulting [facility] ID for Bee Luna.  If you have any questions or concerns please call [facility] Infectious Disease at [phone].     Mian Aguirre MD  10/28/2024  2:08 PM         [1]   Allergies  Allergen Reactions    Lactose Intolerance NAUSEA AND VOMITING and SWELLING

## 2024-10-28 NOTE — PROGRESS NOTES
[facility] Hospitalist Progress Note     CC: Hospital Follow up    PCP: London Michel MD       Assessment/Plan:     Principal Problem:    Leukopenia, unspecified type  Active Problems:    Thrombocytopenia (HCC)    Transaminitis    Peripheral edema    Anemia    Fever      Bee Luna is a 34 year old female with PMH sig for anxiety, prior lap bahman who has had several ER and clinic visits for fevers and mild pancytopenia.  Saw Heme on 10/23 and  reported multiple sx including fevfesr, HA, mylagias, chills, and dizzinesss.  Infectious work up so far neg.  Did have WBC 2 Hgb 13.9 Plts 121, ANC 1360,  Differential %'s normal, peripheral smear reviewed by pathology: mild neutropenia, mild lymphocytopenia, mild thrombocytopenia, neutrophils with reactive features such as cytoplasmic vacuoles, lymphocytes appear mature and unremarkable, plts slightly decreased, no platelet clumping or circulating blasts seen.   Was put on augmentin without improvement.  Flow cytometry sent, was trying to get into [facility] for earlier appointment.  Came into er due to persistent sx.  Also feels abx made her hands and feet and face swell althought exam not remarkable, no hives.  No other rashes.  HA improved with fiocet but recurs.  So far afebrile, counts improving.  Possible LP tomorrow if HA persistent,  Infectious work up penidng,  Rheum consult.       Mild pancytopenia- improved   Intermittent Fevers/headache   Main concern would be underlying infectious process   ID and rheum consulted  Ongoing headaches   LP planned today   Several infectious work-up studies still in process   Work-up in process     Elevated LFTs   -ouytpt ap 127, ast 121, alt 197  -repeat ap 137, , , mild uptick again today, repeat in a.m.  - US normal  -Acute hepatitis panel negative.  Suspect infectious source, further work-up in process      Diffuse joint pain, b/l  Likely part of viral prodrome, patient concerned for possible  autoimmune process.   Rheum consulted      FEN:  Hod on fluids  Daily labs   Diet:NPO pending LP, ok for diet after     PPX; SCD,Heparin held for LP today       Questions/concerns were discussed with patient and/or family by bedside.      Thank You,  Cayden Howell, DO    Hospitalist with [facility]     Subjective:     Feels overall ongoing headache and joint pains, as well as low grade fevers , just tired and not her self     OBJECTIVE:    Blood pressure 90/54, pulse 81, temperature 98 °F (36.7 °C), temperature source Axillary, resp. rate 16, height 5' 7\" (1.702 m), weight 166 lb (75.3 kg), last menstrual period 10/02/2024, SpO2 96%, not currently breastfeeding.    Temp:  [98 °F (36.7 °C)-98.6 °F (37 °C)] 98 °F (36.7 °C)  Pulse:  [77-93] 81  Resp:  [16-18] 16  BP: (85-90)/(49-58) 90/54  SpO2:  [96 %-100 %] 96 %      Intake/Output:    Intake/Output Summary (Last 24 hours) at 10/28/2024 1300  Last data filed at 10/27/2024 1859  Gross per 24 hour   Intake 2500 ml   Output --   Net 2500 ml       Last 3 Weights   10/25/24 2024 166 lb (75.3 kg)   10/25/24 1535 166 lb (75.3 kg)   10/21/24 0913 155 lb (70.3 kg)   01/13/20 1606 200 lb (90.7 kg)       Exam   Gen: No acute distress, alert and oriented x3, tired appearing   Pulm: Lungs clear, normal respiratory effort  CV: Heart with regular rate and rhythm  Abd: Abdomen soft, nontender, non-distended  MSK: no clubbing, no cyanosis      Data Review:       Labs:     Recent Labs   Lab 10/26/24  0903 10/27/24  0717 10/27/24  1714   RBC 3.56* 3.64* 3.79*   HGB 11.0* 10.9* 11.7*   HCT 32.5* 31.6* 34.6*   MCV 91.3 86.8 91.3   MCH 30.9 29.9 30.9   MCHC 33.8 34.5 33.8   RDW 12.8 12.8 12.8   NEPRELIM 3.01 3.27 1.91   WBC 4.9 5.1 3.8*   .0* 174.0 203.0         Recent Labs   Lab 10/26/24  0903 10/27/24  0717 10/28/24  0546   GLU 83 86 88   BUN 7* 7* <5*   CREATSERUM 0.55 0.62 0.60   EGFRCR 124 121 121   CA 7.9* 8.0* 8.4*    139 139   K 3.7 4.0 4.0    107 107    CO2 25.0 27.0 27.0       Recent Labs   Lab 10/25/24  1727 10/26/24  0903 10/27/24  0717 10/28/24  0546   * 376* 389* 456*   * 222* 212* 256*   ALB 3.6 3.5 3.5 3.6   *  --   --   --          Imaging:  US LIVER (CPT=76705)    Result Date: 10/26/2024  CONCLUSION:   Unremarkable sonographic appearance of the liver.  Cholecystectomy.     Dictated by (CST): Esa Hussein MD on 10/26/2024 at 1:01 PM     Finalized by (CST): Esa Hussein MD on 10/26/2024 at 1:02 PM          CT BRAIN OR HEAD (CPT=70450)    Result Date: 10/26/2024  CONCLUSION:   No acute intracranial process by noncontrast CT technique.   elm-remote  Dictated by (CST): Abiodun Peraza MD on 10/26/2024 at 10:18 AM     Finalized by (CST): Abiodun Peraza MD on 10/26/2024 at 10:20 AM          XR CHEST AP PORTABLE  (CPT=71045)    Result Date: 10/25/2024  CONCLUSION:   No acute cardiopulmonary abnormality.    Dictated by (CST): Felix Duggan MD on 10/25/2024 at 9:51 PM     Finalized by (CST): Felix Duggan MD on 10/25/2024 at 9:51 PM             Meds:      polyethylene glycol (PEG 3350)  17 g Oral Daily    heparin  5,000 Units Subcutaneous Q8H AURORA    sodium chloride  500 mL Intravenous Once    escitalopram  10 mg Oral Daily with breakfast    melatonin  5 mg Oral Nightly    [Held by provider] spironolactone  200 mg Oral Daily         butalbital-acetaminophen-caffeine    polyethylene glycol (PEG 3350)    sennosides    bisacodyl    ondansetron    prochlorperazine    acetaminophen

## 2024-10-28 NOTE — PLAN OF CARE
Bee is A&Ox4. Room air. Reported headache treated with PRN Fioricet. Hypotensive & symptomatic dizziness and MD made aware, pt refused bolus, continued to monitor BP overnight. NPO since midnight for possible LP today.     Problem: Patient Centered Care  Goal: Patient preferences are identified and integrated in the patient's plan of care  Description: Interventions:  - What would you like us to know as we care for you? From home with family   - Provide timely, complete, and accurate information to patient/family  - Incorporate patient and family knowledge, values, beliefs, and cultural backgrounds into the planning and delivery of care  - Encourage patient/family to participate in care and decision-making at the level they choose  - Honor patient and family perspectives and choices  Outcome: Progressing     Problem: PAIN - ADULT  Goal: Verbalizes/displays adequate comfort level or patient's stated pain goal  Description: INTERVENTIONS:  - Encourage pt to monitor pain and request assistance  - Assess pain using appropriate pain scale  - Administer analgesics based on type and severity of pain and evaluate response  - Implement non-pharmacological measures as appropriate and evaluate response  - Consider cultural and social influences on pain and pain management  - Manage/alleviate anxiety  - Utilize distraction and/or relaxation techniques  - Monitor for opioid side effects  - Notify MD/LIP if interventions unsuccessful or patient reports new pain  - Anticipate increased pain with activity and pre-medicate as appropriate  Outcome: Progressing     Problem: GASTROINTESTINAL - ADULT  Goal: Minimal or absence of nausea and vomiting  Description: INTERVENTIONS:  - Maintain adequate hydration with IV or PO as ordered and tolerated  - Nasogastric tube to low intermittent suction as ordered  - Evaluate effectiveness of ordered antiemetic medications  - Provide nonpharmacologic comfort measures as appropriate  - Advance  diet as tolerated, if ordered  - Obtain nutritional consult as needed  - Evaluate fluid balance  Outcome: Progressing  Goal: Maintains or returns to baseline bowel function  Description: INTERVENTIONS:  - Assess bowel function  - Maintain adequate hydration with IV or PO as ordered and tolerated  - Evaluate effectiveness of GI medications  - Encourage mobilization and activity  - Obtain nutritional consult as needed  - Establish a toileting routine/schedule  - Consider collaborating with pharmacy to review patient's medication profile  Outcome: Progressing     Problem: HEMATOLOGIC - ADULT  Goal: Maintains hematologic stability  Description: INTERVENTIONS  - Assess for signs and symptoms of bleeding or hemorrhage  - Monitor labs and vital signs for trends  - Administer supportive blood products/factors, fluids and medications as ordered and appropriate  - Administer supportive blood products/factors as ordered and appropriate  Outcome: Progressing     Problem: MUSCULOSKELETAL - ADULT  Goal: Return mobility to safest level of function  Description: INTERVENTIONS:  - Assess patient stability and activity tolerance for standing, transferring and ambulating w/ or w/o assistive devices  - Assist with transfers and ambulation using safe patient handling equipment as needed  - Ensure adequate protection for wounds/incisions during mobilization  - Obtain PT/OT consults as needed  - Advance activity as appropriate  - Communicate ordered activity level and limitations with patient/family  Outcome: Progressing     Problem: Impaired Activities of Daily Living  Goal: Achieve highest/safest level of independence in self care  Description: Interventions:  - Assess ability and encourage patient to participate in ADLs to maximize function  - Promote sitting position while performing ADLs such as feeding, grooming, and bathing  - Educate and encourage patient/family in tolerated functional activity level and precautions during  self-care  Outcome: Progressing     Problem: SAFETY ADULT - FALL  Goal: Free from fall injury  Description: INTERVENTIONS:  - Assess pt frequently for physical needs  - Identify cognitive and physical deficits and behaviors that affect risk of falls.  - [place] fall precautions as indicated by assessment.  - Educate pt/family on patient safety including physical limitations  - Instruct pt to call for assistance with activity based on assessment  - Modify environment to reduce risk of injury  - Provide assistive devices as appropriate  - Consider OT/PT consult to assist with strengthening/mobility  - Encourage toileting schedule  Outcome: Progressing     Problem: DISCHARGE PLANNING  Goal: Discharge to home or other facility with appropriate resources  Description: INTERVENTIONS:  - Identify barriers to discharge w/pt and caregiver  - Include patient/family/discharge partner in discharge planning  - Arrange for needed discharge resources and transportation as appropriate  - Identify discharge learning needs (meds, wound care, etc)  - Arrange for interpreters to assist at discharge as needed  - Consider post-discharge preferences of patient/family/discharge partner  - Complete POLST form as appropriate  - Assess patient's ability to be responsible for managing their own health  - Refer to Case Management Department for coordinating discharge planning if the patient needs post-hospital services based on physician/LIP order or complex needs related to functional status, cognitive ability or social support system  Outcome: Progressing

## 2024-10-29 VITALS
TEMPERATURE: 99 F | WEIGHT: 166 LBS | RESPIRATION RATE: 18 BRPM | SYSTOLIC BLOOD PRESSURE: 96 MMHG | BODY MASS INDEX: 26.06 KG/M2 | HEART RATE: 74 BPM | OXYGEN SATURATION: 98 % | DIASTOLIC BLOOD PRESSURE: 59 MMHG | HEIGHT: 67 IN

## 2024-10-29 LAB
ALBUMIN SERPL-MCNC: 3.8 G/DL (ref 3.2–4.8)
ALBUMIN/GLOB SERPL: 1.5 {RATIO} (ref 1–2)
ALP LIVER SERPL-CCNC: 157 U/L
ALT SERPL-CCNC: 461 U/L
ANION GAP SERPL CALC-SCNC: 5 MMOL/L (ref 0–18)
AST SERPL-CCNC: 214 U/L (ref ?–34)
BASOPHILS # BLD AUTO: 0.02 X10(3) UL (ref 0–0.2)
BASOPHILS NFR BLD AUTO: 0.4 %
BILIRUB SERPL-MCNC: 0.3 MG/DL (ref 0.3–1.2)
BUN BLD-MCNC: 7 MG/DL (ref 9–23)
BUN/CREAT SERPL: 10.8 (ref 10–20)
CALCIUM BLD-MCNC: 8.9 MG/DL (ref 8.7–10.4)
CHLORIDE SERPL-SCNC: 105 MMOL/L (ref 98–112)
CMV IGG AB: 6.7 U/ML
CMV IGM AB: <30 AU/ML
CO2 SERPL-SCNC: 28 MMOL/L (ref 21–32)
CREAT BLD-MCNC: 0.65 MG/DL
CYTOMEGALOVIRUS (CMV): NOT DETECTED
CYTOMEGALOVIRUS (CMV): NOT DETECTED
DEPRECATED RDW RBC AUTO: 42 FL (ref 35.1–46.3)
DEPRECATED RDW RBC AUTO: 42 FL (ref 35.1–46.3)
EGFRCR SERPLBLD CKD-EPI 2021: 119 ML/MIN/1.73M2 (ref 60–?)
ENTEROVIRUS (EV): NOT DETECTED
ENTEROVIRUS (EV): NOT DETECTED
EOSINOPHIL # BLD AUTO: 0.11 X10(3) UL (ref 0–0.7)
EOSINOPHIL NFR BLD AUTO: 2.5 %
ERYTHROCYTE [DISTWIDTH] IN BLOOD BY AUTOMATED COUNT: 12.7 % (ref 11–15)
ERYTHROCYTE [DISTWIDTH] IN BLOOD BY AUTOMATED COUNT: 12.7 % (ref 11–15)
ESCHERICHIA COLI K1: NOT DETECTED
ESCHERICHIA COLI K1: NOT DETECTED
GLOBULIN PLAS-MCNC: 2.6 G/DL (ref 2–3.5)
GLUCOSE BLD-MCNC: 84 MG/DL (ref 70–99)
HAEMOPHILUS INFLUENZAE: NOT DETECTED
HAEMOPHILUS INFLUENZAE: NOT DETECTED
HCT VFR BLD AUTO: 35 %
HCT VFR BLD AUTO: 35 %
HERPES SIMPLEX VIRUS 1 (HSV-1): NOT DETECTED
HERPES SIMPLEX VIRUS 1 (HSV-1): NOT DETECTED
HERPES SIMPLEX VIRUS 2 (HSV-2): NOT DETECTED
HERPES SIMPLEX VIRUS 2 (HSV-2): NOT DETECTED
HGB BLD-MCNC: 11.6 G/DL
HGB BLD-MCNC: 11.6 G/DL
HUMAN HERPESVIRUS 6 (HHV-6): NOT DETECTED
HUMAN HERPESVIRUS 6 (HHV-6): NOT DETECTED
HUMAN PARECHOVIRUS (HPEV): NOT DETECTED
HUMAN PARECHOVIRUS (HPEV): NOT DETECTED
IMM GRANULOCYTES # BLD AUTO: 0.02 X10(3) UL (ref 0–1)
IMM GRANULOCYTES NFR BLD: 0.4 %
LISTERIA MONOCYTOGENES: NOT DETECTED
LISTERIA MONOCYTOGENES: NOT DETECTED
LYMPHOCYTES # BLD AUTO: 1.8 X10(3) UL (ref 1–4)
LYMPHOCYTES NFR BLD AUTO: 40.4 %
MCH RBC QN AUTO: 29.5 PG (ref 26–34)
MCH RBC QN AUTO: 29.5 PG (ref 26–34)
MCHC RBC AUTO-ENTMCNC: 33.1 G/DL (ref 31–37)
MCHC RBC AUTO-ENTMCNC: 33.1 G/DL (ref 31–37)
MCV RBC AUTO: 89.1 FL
MCV RBC AUTO: 89.1 FL
MONOCYTES # BLD AUTO: 0.3 X10(3) UL (ref 0.1–1)
MONOCYTES NFR BLD AUTO: 6.7 %
NEISSERIA MENINGIDITIS: NOT DETECTED
NEISSERIA MENINGIDITIS: NOT DETECTED
NEUTROPHILS # BLD AUTO: 2.21 X10 (3) UL (ref 1.5–7.7)
NEUTROPHILS # BLD AUTO: 2.21 X10(3) UL (ref 1.5–7.7)
NEUTROPHILS NFR BLD AUTO: 49.6 %
NS CRYPTOCOCCUS (C. NEOFORMANS/C. GATTII): NOT DETECTED
OSMOLALITY SERPL CALC.SUM OF ELEC: 283 MOSM/KG (ref 275–295)
PLATELET # BLD AUTO: 275 10(3)UL (ref 150–450)
PLATELET # BLD AUTO: 275 10(3)UL (ref 150–450)
POTASSIUM SERPL-SCNC: 4.2 MMOL/L (ref 3.5–5.1)
PROT SERPL-MCNC: 6.4 G/DL (ref 5.7–8.2)
RBC # BLD AUTO: 3.93 X10(6)UL
RBC # BLD AUTO: 3.93 X10(6)UL
SODIUM SERPL-SCNC: 138 MMOL/L (ref 136–145)
STREPTOCOCCUS AGALACTIAE: NOT DETECTED
STREPTOCOCCUS AGALACTIAE: NOT DETECTED
STREPTOCOCCUS PNEUMONIAE: NOT DETECTED
STREPTOCOCCUS PNEUMONIAE: NOT DETECTED
VARICELLA ZOSTER VIRUS (VZV): NOT DETECTED
VARICELLA ZOSTER VIRUS (VZV): NOT DETECTED
WBC # BLD AUTO: 4.5 X10(3) UL (ref 4–11)
WBC # BLD AUTO: 4.5 X10(3) UL (ref 4–11)

## 2024-10-29 RX ORDER — BUTALBITAL, ACETAMINOPHEN AND CAFFEINE 50; 325; 40 MG/1; MG/1; MG/1
2 TABLET ORAL EVERY 4 HOURS PRN
Qty: 20 TABLET | Refills: 0 | Status: SHIPPED | OUTPATIENT
Start: 2024-10-29

## 2024-10-29 RX ORDER — ONDANSETRON 4 MG/1
4 TABLET, ORALLY DISINTEGRATING ORAL EVERY 8 HOURS PRN
Qty: 21 TABLET | Refills: 0 | Status: SHIPPED | OUTPATIENT
Start: 2024-10-29 | End: 2024-11-05

## 2024-10-29 RX ORDER — KETOROLAC TROMETHAMINE 30 MG/ML
30 INJECTION, SOLUTION INTRAMUSCULAR; INTRAVENOUS EVERY 6 HOURS PRN
Status: DISCONTINUED | OUTPATIENT
Start: 2024-10-29 | End: 2024-10-29

## 2024-10-29 RX ORDER — HYDROCODONE BITARTRATE AND ACETAMINOPHEN 5; 325 MG/1; MG/1
1 TABLET ORAL EVERY 6 HOURS PRN
Status: DISCONTINUED | OUTPATIENT
Start: 2024-10-29 | End: 2024-10-29

## 2024-10-29 NOTE — PLAN OF CARE
Pt HA relieved with fioricet, new order of prn norco for pain, administered. Up self, voiding freely. SL. Afebrile overnight. Monitoring labs. Tolerating general diet, low appetite. Safety precautions in place.     Problem: Patient Centered Care  Goal: Patient preferences are identified and integrated in the patient's plan of care  Description: Interventions:  - What would you like us to know as we care for you? I travel for work  - Provide timely, complete, and accurate information to patient/family  - Incorporate patient and family knowledge, values, beliefs, and cultural backgrounds into the planning and delivery of care  - Encourage patient/family to participate in care and decision-making at the level they choose  - Honor patient and family perspectives and choices  Outcome: Progressing     Problem: HEMATOLOGIC - ADULT  Goal: Maintains hematologic stability  Description: INTERVENTIONS  - Assess for signs and symptoms of bleeding or hemorrhage  - Monitor labs and vital signs for trends  - Administer supportive blood products/factors, fluids and medications as ordered and appropriate  - Administer supportive blood products/factors as ordered and appropriate  Outcome: Progressing     Problem: MUSCULOSKELETAL - ADULT  Goal: Return mobility to safest level of function  Description: INTERVENTIONS:  - Assess patient stability and activity tolerance for standing, transferring and ambulating w/ or w/o assistive devices  - Assist with transfers and ambulation using safe patient handling equipment as needed  - Ensure adequate protection for wounds/incisions during mobilization  - Obtain PT/OT consults as needed  - Advance activity as appropriate  - Communicate ordered activity level and limitations with patient/family  Outcome: Progressing

## 2024-10-29 NOTE — DISCHARGE SUMMARY
General Medicine Discharge Summary     Patient ID:  Bee Luna  34 year old  1/16/1990    Admit date: 10/25/2024    Discharge date and time: 10/29/2024  1:29 PM     Attending Physician: Cayden Howell DO     Consults: IP CONSULT TO INFECTIOUS DISEASE  IP CONSULT TO HEMATOLOGY  IP CONSULT TO RHEUMATOLOGY    Primary Care Physician: London Michel MD     Reason for admission: Headache, muscle aches     Risk For Readmission: low     Discharge Diagnoses: Peripheral edema [R60.0]  Transaminitis [R74.01]  Leukopenia, unspecified type [D72.819]  See Additional Discharge Diagnoses in Hospital Course    Discharged Condition: good    Follow-up with labs/images appointments:   Patient has several lab tests pending at the time of discharge including extensive infectious and rheumatologic workup for cause of headache muscle aches and joint pains  Close follow-up with primary care provider and infectious disease provider recommended within 2 weeks  Patient also has elevation in LFTs likely as a result of viral process which needs to be repeated in 4 to 6 weeks to assure resolution      Exam  Gen: No acute distress  Pulm: Lungs clear, normal respiratory effort  CV: Heart with regular rate and rhythm  Abd: Abdomen soft,   EXT: no edema     HPI:   Per Dr. Allen    Bee Luna is a 34 year old female with PMH sig for anxiety, prior lap bahman who has had several ER and clinic visits for fevers and mild pancytopenia.  Saw Heme on 10/23 and  reported multiple sx including fevfesr, HA, mylagias, chills, and dizzinesss.  Infectious work up so far neg.  Did have WBC 2 Hgb 13.9 Plts 121, ANC 1360,  Differential %'s normal, peripheral smear reviewed by pathology: mild neutropenia, mild lymphocytopenia, mild thrombocytopenia, neutrophils with reactive features such as cytoplasmic vacuoles, lymphocytes appear mature and unremarkable, plts slightly decreased, no platelet clumping or circulating blasts seen.   Was put  on augmentin without improvement.  Flow cytometry sent, was trying to get into [facility] for earlier appointment.  Came into er due to persistent sx.  Also feels abx made her hands and feet and face swell althought exam not remarkable, no hives.  No other rashes.  Pt feels better after HA cocktail.  LFTs are also elevated.  Plan for heme and ID work up.       Dw patient in agreement.  Sx have been fluctutaing for over 1 week.  No other sick contacts.  No Cp or SOB.  Afebrile here. Repeat Wbc IS 4, PLTs overall stable.       Hospital Course:     Patient was admitted for expedited workup of pancytopenia muscle aches fevers ultimately infectious workup was negative including lumbar puncture several infectious serologies were still pending at the time of discharge close follow-up with primary care provider and infectious disease provider was recommended to follow-up on these pending tests.  Patient also has elevation in LFTs which I suspect to be postviral repeat LFTs are recommended in 4 to 6 weeks..   She was given brief course of Zofran and Fioricet for headache        Operative Procedures:      Imaging: XR LUMBAR PUNCTURE DIAG, INCLD IMG (CPT=62328)    Result Date: 10/28/2024  CONCLUSION:  1. Successful fluoroscopic guided lumbar puncture for CSF evaluation. 2. Approximately 11.5 mL of clear cerebral spinal fluid withdrawn into 4 separate sterile vials. 3. Patient tolerated the procedure without immediate complications     Dictated by (CST): Milad Hassan MD on 10/28/2024 at 3:29 PM     Finalized by (CST): Milad Hassan MD on 10/28/2024 at 3:37 PM           Disposition: home    Activity: activity as tolerated  Diet: regular diet  Wound Care: none needed  Code Status: Full Code  O2: None    Home Medication Changes: See list below     Med list     Medication List        START taking these medications      butalbital-acetaminophen-caffeine -40 MG Tabs  Commonly known as: Fioricet  Take 2 tablets by mouth  every 4 (four) hours as needed for Headaches.     ondansetron 4 MG Tbdp  Commonly known as: Zofran-ODT  Take 1 tablet (4 mg total) by mouth every 8 (eight) hours as needed for Nausea.  Notes to patient: Last given 10/29 at 07:41             CONTINUE taking these medications      escitalopram 10 MG Tabs  Commonly known as: Lexapro     melatonin 5 MG Caps     Mirena (52 MG) 20 MCG/DAY Iud  Generic drug: Levonorgestrel     semaglutide 2 MG/3ML Sopn  Commonly known as: Ozempic     spironolactone 100 MG Tabs  Commonly known as: Aldactone            STOP taking these medications      albuterol 108 (90 Base) MCG/ACT Aers  Commonly known as: Ventolin HFA     amoxicillin clavulanate 875-125 MG Tabs  Commonly known as: Augmentin     azithromycin 250 MG Tabs  Commonly known as: Zithromax     fluticasone propionate 50 MCG/ACT Susp  Commonly known as: Flonase               Where to Get Your Medications        These medications were sent to [facility]/pharmacy #[id number] - [place], [place] - [address] [phone], [phone]  [address] [place] [address]      Phone: [phone]   butalbital-acetaminophen-caffeine -40 MG Tabs  ondansetron 4 MG Tbdp         FU   Follow-up Information       London Michel MD Follow up in 2 week(s).    Specialty: Internal Medicine  Contact information:  [address] [facility] [address]  [facility] [address] [phone]               Mian Aguirre MD. Schedule an appointment as soon as possible for a visit in 1 week(s).    Specialties: INFECTIOUS DISEASES, Internal Medicine  Why: Follow up appointment   As needed if testing is abnormal  Contact information:  [address] [facility]  [address]  [place] [address] [phone]                             [place] instructions:      Other Discharge Instructions:         Avoid alcohol, we suggest you have repeat liver function testing in 4-6 weeks to make sure things return to normal. You do have several tests that are pending at the time of  discharge and you will need to discuss results with your primary care provider         I reconciled current and discharge medications on day of discharge, discussed changes with patient and noted changes above.       Total Time Coordinating Care: 35 minutes    Patient had opportunity to ask questions and state understand and agree with therapeutic plan as outlined    Thank You,    Cayden Howell, DO   Hospitalist with [facility]

## 2024-10-29 NOTE — PAYOR COMM NOTE
--------------  ADMISSION REVIEW     Payor: [employer]   Subscriber #:  [id number]  Authorization Number: [id number]    Admit date: 10/25/24  Admit time:  8:15 PM       REVIEW DOCUMENTATION:     ED Provider Notes        ED Provider Notes signed by Papi Mccall MD at 10/25/2024  7:06 PM       Author: Papi Mccall MD Service: -- Author Type: Physician    Filed: 10/25/2024  7:06 PM Date of Service: 10/25/2024  5:34 PM Status: Signed    : Papi Mccall MD (Physician)           Patient Seen in: [facility] Emergency Department      History     Chief Complaint   Patient presents with    Abnormal Labs     Stated Complaint: Abnormal Labs - PCP Ref    Subjective:   HPI      Patient presents the emergency department with several days of not feeling well and abnormal lab studies.  According to the patient and the  who helps provide history, patient presented to the emergency department over the weekend with fevers and bodyaches and headaches.  She was placed on antibiotics for what was suspected to be a sinus infection.  She was noted to have a low white blood cell count and on repeat level it was continuing to decline.  She saw an oncologist yesterday who geo another blood test and found that the blood count was continuing to decline and recommended she come to the hospital for admission and possible bone marrow biopsy.  She states that since starting the antibiotics she has had some swelling in her hands feet and face.  There is no neck pain or neck stiffness.  There is been some nausea.  There is a dull throbbing aching headache.  There is no other aggravating or alleviating factors.  She continues to have intermittent fever but she is afebrile on presentation today.    Objective:     Past Medical History:    Anxiety    Lactose intolerance              Past Surgical History:   Procedure Laterality Date    Cholecystectomy  2013         Physical Exam     ED Triage Vitals [10/25/24  1535]   /65   Pulse 88   Resp 21   Temp 98.2 °F (36.8 °C)   Temp src Oral   SpO2 97 %   O2 Device None (Room air)       Current Vitals:   Vital Signs  BP: 97/58  Pulse: 80  Resp: 15  Temp: 98.2 °F (36.8 °C)  Temp src: Oral    Oxygen Therapy  SpO2: 100 %  O2 Device: None (Room air)        Physical Exam  Vitals and nursing note reviewed.   Constitutional:       General: She is not in acute distress.     Appearance: She is well-developed.   HENT:      Head: Normocephalic.      Nose: Nose normal.      Mouth/Throat:      Mouth: Mucous membranes are moist.   Eyes:      Conjunctiva/sclera: Conjunctivae normal.   Cardiovascular:      Rate and Rhythm: Normal rate and regular rhythm.      Heart sounds: No murmur heard.  Pulmonary:      Effort: Pulmonary effort is normal. No respiratory distress.      Breath sounds: Normal breath sounds.   Abdominal:      General: There is no distension.      Palpations: Abdomen is soft.      Tenderness: There is no abdominal tenderness.   Musculoskeletal:         General: No tenderness. Normal range of motion.      Cervical back: Normal range of motion and neck supple.   Skin:     General: Skin is warm and dry.      Capillary Refill: Capillary refill takes less than 2 seconds.      Findings: No rash.   Neurological:      General: No focal deficit present.      Mental Status: She is alert and oriented to person, place, and time.      Cranial Nerves: No cranial nerve deficit.      Sensory: No sensory deficit.      Motor: No weakness.      Coordination: Coordination normal.      Gait: Gait normal.      Deep Tendon Reflexes: Reflexes normal.             ED Course     Labs Reviewed   CBC WITH DIFFERENTIAL WITH PLATELET - Abnormal; Notable for the following components:       Result Value    HGB 11.7 (*)     HCT 34.4 (*)     .0 (*)     All other components within normal limits   COMP METABOLIC PANEL (14) - Abnormal; Notable for the following components:    BUN 6 (*)     Calcium, Total  8.1 (*)      (*)      (*)     Alkaline Phosphatase 137 (*)     All other components within normal limits   C-REACTIVE PROTEIN - Abnormal; Notable for the following components:    C-Reactive Protein 1.50 (*)     All other components within normal limits   LACTIC ACID, PLASMA - Normal   PROTHROMBIN TIME (PT) - Normal   PTT, ACTIVATED - Normal   SED RATE, WESTERGREN (AUTOMATED) - Normal   POCT PREGNANCY URINE - Normal   RESPIRATORY FLU EXPAND PANEL + COVID-19 - Normal         Admission disposition: 10/25/2024  6:46 PM           Disposition and Plan     Clinical Impression:  1. Leukopenia, unspecified type    2. Transaminitis    3. Peripheral edema         Disposition:  Admit       Hospital Problems       Present on Admission  Date Reviewed: 1/24/2020            ICD-10-CM Noted POA    * (Principal) Leukopenia, unspecified type D72.819 10/25/2024 Unknown    Thrombocytopenia (HCC) D69.6 10/25/2024 Yes          Signed by Papi Mccall MD on 10/25/2024  7:06 PM         10/25 H&P    CC:       Chief Complaint   Patient presents with    Abnormal Labs       Assessment and Plan         Bee Luna is a 34 year old female with PMH sig for anxiety, prior lap bahman who has had several ER and clinic visits for fevers and mild pancytopenia.  Saw Heme on 10/23 and  reported multiple sx including fevfesr, HA, mylagias, chills, and dizzinesss.  Infectious work up so far neg.  Did have WBC 2 Hgb 13.9 Plts 121, ANC 1360,  Differential %'s normal, peripheral smear reviewed by pathology: mild neutropenia, mild lymphocytopenia, mild thrombocytopenia, neutrophils with reactive features such as cytoplasmic vacuoles, lymphocytes appear mature and unremarkable, plts slightly decreased, no platelet clumping or circulating blasts seen.   Was put on augmentin without improvement.  Flow cytometry sent, was trying to get into [facility] for earlier appointment.  Came into er due to persistent sx.  Also feels abx made her  hands and feet and face swell althought exam not remarkable, no hives.  No other rashes.  Pt feels better after HA cocktail.  LFTs are also elevated.  Plan for heme and ID work up.       Mild pancytopenia  -wbc was 2 as oupt, now 4, plts still low around 110  -hgb 11.7  -flow cytometry outpt pending  -Follow up heme recs     Intermittent fevers  -afebrile here, RVP neg  -blood cultrues drawn  -ESR nromal,. CRp mildly elevated, lactic normal   -monitor off abx for now,. ID consult  -see lfts below     Elevated LFTs   -ouytpt ap 127, ast 121, alt 197  -repeat ap 137, ,   - will get US      FEN: IVF prn, lytes in AM, gen diet     PPX; SCD, Monitor plts     Dipso full code pending course      Thank You,  Kaia Allen MD  [facility] [place] [facility]     Answering Service number: [phone]     HPI      Bee Luna is a 34 year old female with PMH sig for anxiety, prior lap bahman who has had several ER and clinic visits for fevers and mild pancytopenia.  Saw Heme on 10/23 and  reported multiple sx including fevfesr, HA, mylagias, chills, and dizzinesss.  Infectious work up so far neg.  Did have WBC 2 Hgb 13.9 Plts 121, ANC 1360,  Differential %'s normal, peripheral smear reviewed by pathology: mild neutropenia, mild lymphocytopenia, mild thrombocytopenia, neutrophils with reactive features such as cytoplasmic vacuoles, lymphocytes appear mature and unremarkable, plts slightly decreased, no platelet clumping or circulating blasts seen.   Was put on augmentin without improvement.  Flow cytometry sent, was trying to get into [facility] for earlier appointment.  Came into er due to persistent sx.  Also feels abx made her hands and feet and face swell althought exam not remarkable, no hives.  No other rashes.  Pt feels better after HA cocktail.  LFTs are also elevated.  Plan for heme and ID work up.       Dw patient in agreement.  Sx have been fluctutaing for over 1 week.  No other sick contacts.  No Cp or  SOB.  Afebrile here. Repeat Wbc IS 4, PLTs overall stable.      Review of Systems  12 point systems reviewed, please see HPI for pertinent positives, otherwise negative     History      PMH  Past Medical History       Past Medical History:    Anxiety    Lactose intolerance            PSH  Past Surgical History         Past Surgical History:   Procedure Laterality Date    Cholecystectomy   2013           Objective      Temp: 98.2 °F (36.8 °C)  Pulse: 80  Resp: 15  BP: 97/58     Exam  Gen: No acute distress, alert and oriented x3  Neck Supple, no JVD  Pulm: Lungs clear, normal respiratory effort, No wheezing or crackles  CV: Heart with regular rate and rhythm, No murmurs, rubs, gallops  Abd: Abdomen soft, nontender, nondistended, no organomegaly, bowel sounds present  MSK: Full range of motion in extremities, no clubbing, no cyanosis.  No Lower extremity edema  Skin: no rashes or lesions, well perfused  Psych: mood stable, cooperative  Neuro: No focal deficits     Diagnostic Data:    CBC/Chem       Recent Labs   Lab 10/21/24  0921 10/25/24  1727   WBC 2.0* 4.1   HGB 13.9 11.7*   MCV 88.5 88.7   .0* 113.0*   INR  --  1.04              Recent Labs   Lab 10/21/24  0921 10/25/24  1727   * 141   K 3.7 4.1    110   CO2 25.0 25.0   BUN 8* 6*   CREATSERUM 0.68 0.59   GLU 95 93   CA 8.6* 8.1*             Recent Labs   Lab 10/25/24  1727   *   *   ALB 3.6        10/25 consult ID  Reason for Consultation:  Leukopenia     History of Present Illness:  Bee Luna is a a(n) 34 year old female being seen at your request regarding leukopenia.  Patient has had several ED visits recently for leukopenia.  She also has seen heme 10/23/24 and at that time c/o fevers, headache, myalgias, etc.  Infectious w/u as an outpatient has been unrevealing.  She was empirically given augmentin without improvement.       Patient does endorse recent travel outside of the country with additional significant body  aches, joint pains.  1 4yo child healthy.     Patient did have flow cytometry sent and is awaiting tertiary center opinion.  As she was not feeling better she presented to the ED for further evaluation.  WBCs are presently coming up into the normal range.  LFTs elevated, hepatitis panel is negative.  HIV pending.  Autoimmune labs also pending.  We are asked to see and assist.     History:  Past Medical History       Past Medical History:    Anxiety    Lactose intolerance         Past Surgical History         Past Surgical History:   Procedure Laterality Date    Cholecystectomy   2013         Family History         Family History   Problem Relation Age of Onset    Other (Other) Mother           thyroid tumor          reports that she has been smoking cigarettes. She has never used smokeless tobacco. She reports that she does not currently use alcohol. She reports that she does not use drugs.     Allergies:  [Allergies]    [Allergies]       Allergen Reactions    Lactose Intolerance NAUSEA AND VOMITING and SWELLING         Medications:    Current Hospital Medications      Current Facility-Administered Medications:     butalbital-acetaminophen-caffeine (Fioricet) -40 MG per tab 2 tablet, 2 tablet, Oral, Q4H PRN    escitalopram (Lexapro) tab 10 mg, 10 mg, Oral, Daily with breakfast    melatonin cap/tab 5 mg, 5 mg, Oral, Nightly    spironolactone (Aldactone) tab 200 mg, 200 mg, Oral, Daily    polyethylene glycol (PEG 3350) (Miralax) 17 g oral packet 17 g, 17 g, Oral, Daily PRN    sennosides (Senokot) tab 17.2 mg, 17.2 mg, Oral, Nightly PRN    bisacodyl (Dulcolax) 10 MG rectal suppository 10 mg, 10 mg, Rectal, Daily PRN    ondansetron (Zofran) 4 MG/2ML injection 4 mg, 4 mg, Intravenous, Q6H PRN    prochlorperazine (Compazine) 10 MG/2ML injection 5 mg, 5 mg, Intravenous, Q8H PRN    acetaminophen (Tylenol Extra Strength) tab 500 mg, 500 mg, Oral, Q4H PRN        Review of Systems:                 Constitutional:   Fever.                HEENT:  No visual changes, oral ulcers, sore throat, difficulty swallowing.                Respiratory: Negative for cough, sputum, hemoptysis, chest pain, wheezing, dyspnea on exertion, or stridor.                Cardiovascular: Negative for chest pain, palpitations, irregular heart beats.                Gastrointestinal:  No abdominal pain, nausea, vomiting, diarrhea, or constipation.                Genitourinary:  No dysuria, hematuria, urine urgency or frequency.                Integument/breast: Negative for rash, skin lesions, and pruritus.                Hematologic/lymphatic: Negative for easy bruising, bleeding, and lymphadenopathy.                Musculoskeletal: Myalgias.                Neurological: Headaches.                Psych:  No h/o anxiety, depression, other psych d/o.                Endocrine: No history of of diabetes, thyroid disorder.     Remainder of 12 point review of systems otherwise negative.     Vital signs in last 24 hours:  Patient Vitals for the past 24 hrs:    BP Temp Temp src Pulse Resp SpO2 Height Weight   10/26/24 0624 91/48 97.9 °F (36.6 °C) Oral -- 16 100 % -- --   10/26/24 0300 -- -- -- 75 -- -- -- --   10/25/24 2137 -- -- -- 92 -- -- -- --   10/25/24 2027 95/57 98.4 °F (36.9 °C) -- 81 18 100 % -- --   10/25/24 2024 -- -- -- -- -- -- -- 166 lb (75.3 kg)   10/25/24 2004 97/54 -- -- 74 16 99 % -- --   10/25/24 1919 93/56 98.9 °F (37.2 °C) Oral 78 15 99 % -- --   10/25/24 1830 97/58 -- -- 80 15 100 % -- --   10/25/24 1743 97/58 -- -- 78 16 99 % -- --   10/25/24 1535 104/65 98.2 °F (36.8 °C) Oral 88 21 97 % 5' 7\" (1.702 m) 166 lb (75.3 kg)         Intake/Output:  No intake/output data recorded.     Physical Exam:                General: Awake, alert, non-tox and in NAD.                Head: Normocephalic, without obvious abnormality, atraumatic.                Eyes: Conjunctivae/corneas clear.  No scleral icterus.  No conjunctival                       hemorrhage.                Nose: Nares normal.                Throat:  Oropharynx clear, MMs moist.                Neck: Trachea ML, no masses.                Lungs: CTA b/l no rhonchi, rales, wheezes.                Chest wall: No tenderness or deformity.                Heart: Regular rate and rhythm, normal S1S2, no murmurs.                Abdomen: Soft, NT/ND.  Bowel sounds present.  No organomegaly.                Extremity: No edema.                Skin: No rashes or lesions.                Neurological: No focal neurologic deficits.     Lab Data Review:        Lab Results   Component Value Date     WBC 4.9 10/26/2024     HGB 11.0 10/26/2024     HCT 32.5 10/26/2024     .0 10/26/2024     CREATSERUM 0.59 10/25/2024     BUN 6 10/25/2024      10/25/2024     K 4.1 10/25/2024      10/25/2024     CO2 25.0 10/25/2024     GLU 93 10/25/2024     CA 8.1 10/25/2024     ALB 3.6 10/25/2024     ALKPHO 137 10/25/2024     BILT 0.3 10/25/2024     TP 5.9 10/25/2024      10/25/2024      10/25/2024     PTT 32.7 10/25/2024     INR 1.04 10/25/2024     ESRML 3 10/25/2024     CRP 1.50 10/25/2024      Cultures:   Blood cultures pending  RVP negative    Radiology:  CONCLUSION:      No acute cardiopulmonary abnormality.       Assessment and Plan:     Syndrome of HA, fever, myalgias and leukopenia leading to several ED and outpatient evaluations in recent weeks  - Flow cytometry sent and plans for tertiary care center evaluation  - WBCs coming up, LFTs elevated  - Hepatitis panel is negative, HIV pending     2.  Transaminitis ?viral process leading to the above  - RVP negative  - Add EBV, CMV, Parvovirus studies  - Check Lyme IgM/IgG, West Nile, chikungunya, dengue  - Check PCT and we can add empiric antimicrobials if needed  - HIV pending     3.  Intractable headache  - Further w/u ongoing, may need LP if symptoms do not improve     4.  Disposition -inpatient.  Supportive care ongoing.  Some autoimmune labs  pending but with numbers coming up it appears a primary bone marrow issue is less likely.  Will add viral studies, Lyme, PCT.  HA ongoing so low threshold for LP with MEM panel - less likely to be a bacterial meningitis - this would be to look for viral, vectorborne processes as most likely etiology of events.  If PCT elevated we can add empiric antimicrobials.  D/w Dr. Allen.  D/w patient.  Will follow.     Asiya Cassidy, , FACOI    10/26 IM     Assessment/Plan:      Bee Luna is a 34 year old female with PMH sig for anxiety, prior lap bahman who has had several ER and clinic visits for fevers and mild pancytopenia.  Saw Heme on 10/23 and  reported multiple sx including fevfesr, HA, mylagias, chills, and dizzinesss.  Infectious work up so far neg.  Did have WBC 2 Hgb 13.9 Plts 121, ANC 1360,  Differential %'s normal, peripheral smear reviewed by pathology: mild neutropenia, mild lymphocytopenia, mild thrombocytopenia, neutrophils with reactive features such as cytoplasmic vacuoles, lymphocytes appear mature and unremarkable, plts slightly decreased, no platelet clumping or circulating blasts seen.   Was put on augmentin without improvement.  Flow cytometry sent, was trying to get into [facility] for earlier appointment.  Came into er due to persistent sx.  Also feels abx made her hands and feet and face swell althought exam not remarkable, no hives.  No other rashes.  Pt feels better after HA cocktail.  LFTs are also elevated.  Plan for heme and ID work up.       Mild pancytopenia  -wbc was 2 as oupt, now 4, plts still low around 110  -hgb 11.7  -flow cytometry outpt pending  -Follow up heme recs  -Hemoglobin 11, platelets improved to 125, white count now 4.9     Intermittent fevers  -afebrile here, RVP neg  -blood cultrues drawn  -ESR nromal,. CRp mildly elevated, lactic normal   -monitor off abx for now,. ID consult  -see lfts below  -Ferritin markedly elevated.  No signs of hemolysis.      Elevated LFTs   -ouytpt ap 127, ast 121, alt 197  -repeat ap 137, , , mild uptick today, repeat in a.m.  - will get US   -Acute hepatitis panel negative.     FEN: IVF prn, lytes in AM, gen diet     PPX; SCD, Monitor plts, hold on any AC, platelets are improved still under 150 but if headaches do not improve may need LP.     Dipso full code pending course     Questions/concerns were discussed with patient and/or family by bedside.     Note: This chart was prepared using voice recognition software and may contain unintended word substitution errors.      Discussed with  at bedside        Thank You,  Kaia Allen M.D.  [facility] [place] Hospitalist  Answering Service: [phone]           Subjective      Had headache again today.  Fioricet seems to be helping.  Afebrile overnight no CP, SOB, or N/V.        Objective      OBJECTIVE:  Temp:  [97.8 °F (36.6 °C)-98.9 °F (37.2 °C)] 97.8 °F (36.6 °C)  Pulse:  [74-92] 81  Resp:  [15-21] 18  BP: ()/(48-65) 89/53  SpO2:  [97 %-100 %] 100 %     Intake/Output:     Intake/Output Summary (Last 24 hours) at 10/26/2024 1503  Last data filed at 10/26/2024 0600      Gross per 24 hour   Intake 1720 ml   Output 700 ml   Net 1020 ml              Last 3 Weights   10/25/24 2024 166 lb (75.3 kg)   10/25/24 1535 166 lb (75.3 kg)   10/21/24 0913 155 lb (70.3 kg)   01/13/20 1606 200 lb (90.7 kg)         Exam  Gen: No acute distress, alert and oriented x3  Neck Supple, no JVD  Pulm: Lungs clear, normal respiratory effort, No wheezing or crackles  CV: Heart with regular rate and rhythm, No murmurs, rubs, gallops  Abd: Abdomen soft, nontender, nondistended, no organomegaly, bowel sounds present  MSK:  no clubbing, no cyanosis.  No Lower extremity edema  Skin: no rashes or lesions, well perfused  Psych: mood stable, cooperative  Neuro: no focal deficits     Medications      Scheduled Medications    escitalopram  10 mg Oral Daily with breakfast    melatonin  5 mg Oral  Nightly    [Held by provider] spironolactone  200 mg Oral Daily         Medication Infusions           PRN Medications     butalbital-acetaminophen-caffeine    polyethylene glycol (PEG 3350)    sennosides    bisacodyl    ondansetron    prochlorperazine    acetaminophen        Data Review:       Labs:            Recent Labs   Lab 10/21/24  0921 10/25/24  1727 10/26/24  0903   WBC 2.0* 4.1 4.9   HGB 13.9 11.7* 11.0*   MCV 88.5 88.7 91.3   .0* 113.0* 125.0*   INR  --  1.04 1.05               Recent Labs   Lab 10/21/24  0921 10/25/24  1727 10/26/24  0903   * 141 138   K 3.7 4.1 3.7    110 107   CO2 25.0 25.0 25.0   BUN 8* 6* 7*   CREATSERUM 0.68 0.59 0.55   CA 8.6* 8.1* 7.9*   GLU 95 93 83              Recent Labs   Lab 10/25/24  1727 10/26/24  0903   * 376*   * 222*   ALB 3.6 3.5   *  --          No results for input(s): \"PGLU\" in the last 168 hours.     No results for input(s): \"TROP\" in the last 168 hours.     Imaging:  US LIVER (CPT=76705)     Result Date: 10/26/2024  PROCEDURE:         US LIVER (CPT=76705)  COMPARISON:         None.  INDICATIONS:        elevated LFTs  TECHNIQUE:       The liver was evaluated with gray scale and colorflow of the main vessels.   FINDINGS:        LIVER:          16.6 cm in length with homogeneous echogenicity.  No masses or bile duct dilatation. Color flow and Doppler imaging of portal and hepatic veins show patency and antegrade flow. BILE DUCTS:            Cholecystectomy.  Common bile duct is nondilated measures 0.3 cm. OTHER:            Visualized portions of pancreas appeared normal.  Right kidney measures 12.2 cm in length without hydronephrosis.           CONCLUSION:          Unremarkable sonographic appearance of the liver.  Cholecystectomy.     Dictated by (CST): Esa Hussein MD on 10/26/2024 at 1:01 PM     Finalized by (CST): Esa Hussein MD on 10/26/2024 at 1:02 PM           CT BRAIN OR HEAD (CPT=70450)     Result Date:  10/26/2024  PROCEDURE:         CT BRAIN OR HEAD (CPT=70450)  COMPARISON:  None.  INDICATIONS:        Frontal and posterior headache.  TECHNIQUE: CT images were obtained without contrast material.  Automated exposure control for dose reduction was used.  Dose information is transmitted to the ACR (American College of Radiology) NRDR (National Radiology Data Registry) which includes the Dose Index Registry.  FINDINGS:          CSF SPACES:        No hydrocephalus, subarachnoid hemorrhage, or effacement of the basal cisterns is appreciated. There is no extra-axial fluid collection. CEREBRUM:          No acute intraparenchymal hemorrhage, edema, or cortical sulcal effacement is apparent. There is no space-occupying lesion, mass effect, or shift of midline structures. The gray-white matter junction is preserved and bilaterally symmetric in appearance. CEREBELLUM:        No edema, hemorrhage, mass, or acute infarction is seen. BRAINSTEM:        No edema, hemorrhage, mass, or acute infarction is seen.  CALVARIUM:       There is no apparent depressed fracture, mass, or other significant visible lesion.  SINUSES:   Limited views demonstrate no significant mucosal thickening or fluid.  ORBITS:     Limited views are grossly unremarkable.  OTHER:        Negative.          CONCLUSION:          No acute intracranial process by noncontrast CT technique.   elm-remote  Dictated by (CST): Abiodun Peraza MD on 10/26/2024 at 10:18 AM     Finalized by (CST): Abiodun Peraza MD on 10/26/2024 at 10:20 AM           XR CHEST AP PORTABLE  (CPT=71045)     Result Date: 10/25/2024  PROCEDURE:         XR CHEST AP PORTABLE  (CPT=71045) TIME:        2103.   COMPARISON:     None.  INDICATIONS:        fever  TECHNIQUE:           Single view.   FINDINGS:   CARDIAC/VASC:    Normal.  No cardiac silhouette abnormality or cardiomegaly.  Unremarkable pulmonary vasculature.          MEDIAST/JERARDO:      No visible mass or adenopathy. LUNGS/PLEURA:        Normal.  No significant pulmonary parenchymal abnormalities.  No effusion or pleural thickening. BONES:       No fracture or visible bony lesion. OTHER:  Negative.           CONCLUSION:          No acute cardiopulmonary abnormality.    Dictated by (CST): Felix Duggan MD on 10/25/2024 at 9:51 PM     Finalized by (CST): Felix Duggan MD on 10/25/2024 at 9:51 PM             10/27 IM     Assessment/Plan:      Bee Luna is a 34 year old female with PMH sig for anxiety, prior lap bahman who has had several ER and clinic visits for fevers and mild pancytopenia.  Saw Heme on 10/23 and  reported multiple sx including fevfesr, HA, mylagias, chills, and dizzinesss.  Infectious work up so far neg.  Did have WBC 2 Hgb 13.9 Plts 121, ANC 1360,  Differential %'s normal, peripheral smear reviewed by pathology: mild neutropenia, mild lymphocytopenia, mild thrombocytopenia, neutrophils with reactive features such as cytoplasmic vacuoles, lymphocytes appear mature and unremarkable, plts slightly decreased, no platelet clumping or circulating blasts seen.   Was put on augmentin without improvement.  Flow cytometry sent, was trying to get into [facility] for earlier appointment.  Came into er due to persistent sx.  Also feels abx made her hands and feet and face swell althought exam not remarkable, no hives.  No other rashes.  HA improved with fiocet but recurs.  So far afebrile, counts improving.  Possible LP tomorrow if HA persistent,  Infectious work up penidng,  Rheum consult.       Mild pancytopenia  -wbc was 2 as oupt, now 4, plts still low around 110  -hgb 11.7  -flow cytometry outpt pending  -Follow up heme recs  -Hemoglobin 11->10.9, Plts and wbc now normalizing     Intermittent fevers  -afebrile here, RVP neg  -blood cultrues drawn  -ESR nromal,. CRp mildly elevated, lactic normal   -monitor off abx for now,. ID consult  -see lfts below  -Ferritin markedly elevated.  No signs of hemolysis.     Elevated LFTs   -ouytpt  ap 127, ast 121, alt 197  -repeat ap 137, , , mild uptick again today, repeat in a.m.  - US normal  -Acute hepatitis panel negative.     Diffuse joint pain, b/l  Likely part of viral prodrome, patient concerned for possible autoimmune process.  Will ask rheumatology to evaluate but discussed with her that empiric steroids would not be advised at this time and have to avoid NSAIDs if LP needed.     FEN: IVF prn, lytes in AM, gen diet     PPX; SCD, plts normalzied, HSQ x2, hold then for possible LP in AM     Dipso full code pending course     Questions/concerns were discussed with patient and/or family by bedside.     Note: This chart was prepared using voice recognition software and may contain unintended word substitution errors.      Discussed with  at bedside        Thank You,  Kaia Allen M.D.  [facility] [place] [facility]  Answering Service: [phone]           Subjective      Fioricet helps but the headaches do come back when it wears off.  Diffuse joint pain hands feet and hips.  Sometimes knees.  No new rashes.  Afebrile overnight no CP, SOB, or N/V.        Objective      OBJECTIVE:  Temp:  [97.8 °F (36.6 °C)-98.7 °F (37.1 °C)] 98.2 °F (36.8 °C)  Pulse:  [71-83] 71  Resp:  [16-18] 18  BP: (89-94)/(55-60) 90/56  SpO2:  [99 %-100 %] 100 %     Intake/Output:     Intake/Output Summary (Last 24 hours) at 10/27/2024 1622  Last data filed at 10/27/2024 1410      Gross per 24 hour   Intake 2180 ml   Output --   Net 2180 ml              Last 3 Weights   10/25/24 2024 166 lb (75.3 kg)   10/25/24 1535 166 lb (75.3 kg)   10/21/24 0913 155 lb (70.3 kg)   01/13/20 1606 200 lb (90.7 kg)         Exam  Gen: No acute distress, alert and oriented x3  Neck Supple, no JVD  Pulm: Lungs clear, normal respiratory effort, No wheezing or crackles  CV: Heart with regular rate and rhythm, No murmurs, rubs, gallops  Abd: Abdomen soft, nontender, nondistended, no organomegaly, bowel sounds present  MSK:  no  clubbing, no cyanosis.  No Lower extremity edema  Skin: no rashes or lesions, well perfused  Psych: mood stable, cooperative  Neuro: no focal deficits     Medications      Scheduled Medications    escitalopram  10 mg Oral Daily with breakfast    melatonin  5 mg Oral Nightly    [Held by provider] spironolactone  200 mg Oral Daily         Medication Infusions           PRN Medications     butalbital-acetaminophen-caffeine    polyethylene glycol (PEG 3350)    sennosides    bisacodyl    ondansetron    prochlorperazine    acetaminophen        Data Review:       Labs:             Recent Labs   Lab 10/21/24  0921 10/25/24  1727 10/26/24  0903 10/27/24  0717   WBC 2.0* 4.1 4.9 5.1   HGB 13.9 11.7* 11.0* 10.9*   MCV 88.5 88.7 91.3 86.8   .0* 113.0* 125.0* 174.0   INR  --  1.04 1.05  --                 Recent Labs   Lab 10/21/24  0921 10/25/24  1727 10/26/24  0903 10/27/24  0717   * 141 138 139   K 3.7 4.1 3.7 4.0    110 107 107   CO2 25.0 25.0 25.0 27.0   BUN 8* 6* 7* 7*   CREATSERUM 0.68 0.59 0.55 0.62   CA 8.6* 8.1* 7.9* 8.0*   MG  --   --   --  1.8   PHOS  --   --   --  3.0   GLU 95 93 83 86               Recent Labs   Lab 10/25/24  1727 10/26/24  0903 10/27/24  0717   * 376* 389*   * 222* 212*   ALB 3.6 3.5 3.5   *  --   --            10/28 IM     Assessment/Plan:      Principal Problem:    Leukopenia, unspecified type  Active Problems:    Thrombocytopenia (HCC)    Transaminitis    Peripheral edema    Anemia    Fever        Bee Luna is a 34 year old female with PMH sig for anxiety, prior lap bahman who has had several ER and clinic visits for fevers and mild pancytopenia.  Saw Heme on 10/23 and  reported multiple sx including fevfesr, HA, mylagias, chills, and dizzinesss.  Infectious work up so far neg.  Did have WBC 2 Hgb 13.9 Plts 121, ANC 1360,  Differential %'s normal, peripheral smear reviewed by pathology: mild neutropenia, mild lymphocytopenia, mild  thrombocytopenia, neutrophils with reactive features such as cytoplasmic vacuoles, lymphocytes appear mature and unremarkable, plts slightly decreased, no platelet clumping or circulating blasts seen.   Was put on augmentin without improvement.  Flow cytometry sent, was trying to get into [facility] for earlier appointment.  Came into er due to persistent sx.  Also feels abx made her hands and feet and face swell althought exam not remarkable, no hives.  No other rashes.  HA improved with fiocet but recurs.  So far afebrile, counts improving.  Possible LP tomorrow if HA persistent,  Infectious work up penidng,  Rheum consult.       Mild pancytopenia- improved   Intermittent Fevers/headache   Main concern would be underlying infectious process   ID and rheum consulted  Ongoing headaches   LP planned today   Several infectious work-up studies still in process   Work-up in process     Elevated LFTs   -ouytpt ap 127, ast 121, alt 197  -repeat ap 137, , , mild uptick again today, repeat in a.m.  - US normal  -Acute hepatitis panel negative.  Suspect infectious source, further work-up in process      Diffuse joint pain, b/l  Likely part of viral prodrome, patient concerned for possible autoimmune process.   Rheum consulted      FEN:  Hod on fluids  Daily labs   Diet:NPO pending LP, ok for diet after      PPX; SCD,Heparin held for LP today         Questions/concerns were discussed with patient and/or family by bedside.        Thank You,  Cayden Howell, DO     Hospitalist with [facility]      Subjective:      Feels overall ongoing headache and joint pains, as well as low grade fevers , just tired and not her self      OBJECTIVE:     Blood pressure 90/54, pulse 81, temperature 98 °F (36.7 °C), temperature source Axillary, resp. rate 16, height 5' 7\" (1.702 m), weight 166 lb (75.3 kg), last menstrual period 10/02/2024, SpO2 96%, not currently breastfeeding.     Temp:  [98 °F (36.7 °C)-98.6 °F (37 °C)] 98 °F  (36.7 °C)  Pulse:  [77-93] 81  Resp:  [16-18] 16  BP: (85-90)/(49-58) 90/54  SpO2:  [96 %-100 %] 96 %        Intake/Output:     Intake/Output Summary (Last 24 hours) at 10/28/2024 1300  Last data filed at 10/27/2024 1859      Gross per 24 hour   Intake 2500 ml   Output --   Net 2500 ml              Last 3 Weights   10/25/24 2024 166 lb (75.3 kg)   10/25/24 1535 166 lb (75.3 kg)   10/21/24 0913 155 lb (70.3 kg)   01/13/20 1606 200 lb (90.7 kg)         Exam   Gen: No acute distress, alert and oriented x3, tired appearing   Pulm: Lungs clear, normal respiratory effort  CV: Heart with regular rate and rhythm  Abd: Abdomen soft, nontender, non-distended  MSK: no clubbing, no cyanosis        Data Review:       Labs:            Recent Labs   Lab 10/26/24  0903 10/27/24  0717 10/27/24  1714   RBC 3.56* 3.64* 3.79*   HGB 11.0* 10.9* 11.7*   HCT 32.5* 31.6* 34.6*   MCV 91.3 86.8 91.3   MCH 30.9 29.9 30.9   MCHC 33.8 34.5 33.8   RDW 12.8 12.8 12.8   NEPRELIM 3.01 3.27 1.91   WBC 4.9 5.1 3.8*   .0* 174.0 203.0                  Recent Labs   Lab 10/26/24  0903 10/27/24  0717 10/28/24  0546   GLU 83 86 88   BUN 7* 7* <5*   CREATSERUM 0.55 0.62 0.60   EGFRCR 124 121 121   CA 7.9* 8.0* 8.4*    139 139   K 3.7 4.0 4.0    107 107   CO2 25.0 27.0 27.0                Recent Labs   Lab 10/25/24  1727 10/26/24  0903 10/27/24  0717 10/28/24  0546   * 376* 389* 456*   * 222* 212* 256*   ALB 3.6 3.5 3.5 3.6   *  --   --   --              MEDICATIONS ADMINISTERED IN LAST 1 DAY:  butalbital-acetaminophen-caffeine (Fioricet) -40 MG per tab 2 tablet       Date Action Dose Route User    10/28/2024 1957 Given 2 tablet Oral Tila Stout RN    10/28/2024 1548 Given 2 tablet Oral Ewelina Malave RN    10/28/2024 1111 Given 2 tablet Oral Ewelina Malave RN    10/28/2024 0505 Given 2 tablet Oral Asia Ruiz RN    10/27/2024 2322 Given 2 tablet Oral Asia Ruiz, SIMONA          escitalopram (Lexapro)  tab 10 mg       Date Action Dose Route User    10/28/2024 0953 Given 10 mg Oral Ewelina Malave RN          melatonin cap/tab 5 mg       Date Action Dose Route User    10/28/2024 1957 Given 5 mg Oral Tila Stout RN    10/27/2024 2115 Given 5 mg Oral Radha Chaves RN          polyethylene glycol (PEG 3350) (Miralax) 17 g oral packet 17 g       Date Action Dose Route User    10/28/2024 1418 Given 17 g Oral Ewelina Malave RN          sennosides (Senokot) tab 17.2 mg       Date Action Dose Route User    10/27/2024 2115 Given 17.2 mg Oral Radha Chaves RN            diphenhydrAMINE (Benadryl) 50 mg/mL injection 25 mg  Dose: 25 mg  Freq: Once Route: IV  Start: 10/25/24 1827 End: 10/25/24 1829          1829 CT-Given                                           ketorolac (Toradol) 15 MG/ML injection 15 mg  Dose: 15 mg  Freq: Once Route: IV  Start: 10/26/24 0630 End: 10/26/24 0630           0630 JA-Given          ketorolac (Toradol) 15 MG/ML injection 15 mg  Dose: 15 mg  Freq: Once Route: IV  Start: 10/25/24 2200 End: 10/25/24 2233          2233 JA-Given           magnesium oxide (Mag-Ox) tab 400 mg  Dose: 400 mg  Freq: Once Route: OR  Start: 10/27/24 1000 End: 10/27/24 1001            1001 LG-Given                          1418 AJ-Given                  sodium chloride 0.9 % IV bolus 1,000 mL  Dose: 1,000 mL  Freq: Once Route: IV  Last Dose: Stopped (10/25/24 1857)  Start: 10/25/24 1646 End: 10/25/24 1857          1728 CT-New Bag     1857 CT-Stopped                ondansetron (Zofran) 4 MG/2ML injection 4 mg  Dose: 4 mg  Freq: Every 6 hours PRN Route: IV  PRN Reasons: Nausea,vomiting  Start: 10/25/24 2021   Admin Instructions:   Default antiemetic sequence (unless otherwise preferred by patient):  1. ondansetron (Zofran)  2. prochlorperazine (Compazine). Wait 15 minutes before proceeding to next medication in sequence.  Follow therapeutic duplication policy.           2143 JA-Given               Vitals  (last day)       Date/Time Temp Pulse Resp BP SpO2 Weight O2 Device O2 Flow Rate (L/min) Who    10/28/24 1955 98.4 °F (36.9 °C) -- 16 102/68 97 % -- None (Room air) -- JA    10/28/24 1513 98.7 °F (37.1 °C) 72 18 96/54 98 % -- None (Room air) -- JR    10/28/24 1410 98.8 °F (37.1 °C) -- 16 97/56 98 % -- None (Room air) -- AJ    10/28/24 1115 98 °F (36.7 °C) -- 16 90/54 96 % -- None (Room air) -- AJ    10/28/24 0501 98.6 °F (37 °C) 81 18 90/56 97 % -- None (Room air) -- DS    10/27/24 2323 98.5 °F (36.9 °C) 77 16 85/49 100 % -- None (Room air) -- MF    10/27/24 2000 -- 93 -- -- -- -- -- -- KD    10/27/24 1639 98.5 °F (36.9 °C) -- 16 90/58 100 % -- None (Room air) -- EP    10/27/24 1200 98.2 °F (36.8 °C) 71 18 90/56 100 % -- None (Room air) -- EP    10/27/24 0838 -- 77 16 94/56 100 % -- None (Room air) -- LG    10/27/24 0519 -- -- 16 89/57 -- -- -- -- JA    10/27/24 0510 98.7 °F (37.1 °C) -- 18 89/55 99 % -- None (Room air) -- SR            10/26/24 2041 97.8 °F (36.6 °C) 83 18 90/60 100 % -- None (Room air) -- SR   10/26/24 2000 -- 81 -- -- -- -- -- -- KD   10/26/24 1130 97.8 °F (36.6 °C) 81 18 89/53 Abnormal  100 % -- None (Room air) -- CM   10/26/24 0624 97.9 °F (36.6 °C) -- 16 91/48 100 % -- None (Room air) -- JA   10/26/24 0300 -- 75 -- -- -- -- -- -- MM   10/25/24 2137 -- 92 -- -- -- -- -- -- MM   10/25/24 2027 98.4 °F (36.9 °C) 81 18 95/57 100 % -- None (Room air) -- JA   10/25/24 2024 -- -- -- -- -- 166 lb (75.3 kg) -- -- JA   10/25/24 2004 -- 74 16 97/54 99 % -- None (Room air) -- CT   10/25/24 1919 98.9 °F (37.2 °C) 78 15 93/56 99 % -- None (Room air) -- CT   10/25/24 1830 -- 80 15 97/58 100 % -- None (Room air) -- CT   10/25/24 1743 -- 78 16 97/58 99 % -- None (Room air) -- CT   10/25/24 1642 -- -- -- -- -- -- None (Room air) -- CT         10/25/24 1535 98.2 °F (36.8 °C) 88 21 104/65 97 % 166 lb (75.3 kg) None (Room air) -- DL

## 2024-10-29 NOTE — CONSULTS
[facility]  part of [facility]    Report of Consultation    Bee Luna Patient Status:  Inpatient    1990 MRN [id number]   Location [facility] 4W/SW/SE Attending Cayden Howell, DO   Hosp Day # 3 PCP London Michel MD     Date of Admission:  10/25/2024  Date of Consult:  10/28/2024  Reason for Consultation:   Joint pain and swelling     History of Present Illness:     This is a 34-year-old female with no significant medical history presenting with symptoms of fever, leukopenia, thrombocytopenia and headaches.  She travels a lot for work as she is an .  She recently travel to [facility] and came back a few weeks ago.  Over the past few weeks she has had a lot of fatigue.  About 1.5 weeks ago she developed a fever of 102.8 and headaches.  She went to the ED on 10/21 and thought that she may have had something viral or sinusitis.  She was given Augmentin with no improvement.  She presented to her PCP with leukopenia, white count 2 and thrombocytopenia.  She had ongoing fevers.  She was eventually seen by hematology/oncology and they were concerned for a primary bone marrow process and was recommending a possible bone marrow biopsy.  She eventually came to the ED and was admitted for further workup and headaches.  She continues to have headaches but improves with Fioricet.  White count has improved.  Platelets have normalized.  The liver enzymes are trending up.  Ultrasound liver was normal.  She has been afebrile.  She had LP done today.  She started have joint pain the day of her admission.  Pain is mostly in her hands, elbows knees and ankles.  Right hand will swell and be very stiff.  The symptoms are new.  Denies any rashes.  She also states her eyes are very red.  Blood work showed negative Lyme.  Positive EBV IgG but negative PCR.  Negative hepatitis and HIV.  ESR normal.  CRP slightly high 1.5.  Chest x-ray normal.  CT of the brain  normal.          Past Medical History  Past Medical History:    Anemia    Anxiety    Lactose intolerance       Past Surgical History  Past Surgical History:   Procedure Laterality Date    Cholecystectomy  2013       Family History  Family History   Problem Relation Age of Onset    Other (Other) Mother         thyroid tumor       Social History  Social History     Socioeconomic History    Marital status:     Number of children: 0   Occupational History    Occupation: investments   Tobacco Use    Smoking status: Some Days     Types: Cigarettes    Smokeless tobacco: Never    Tobacco comments:     Once in a while   Vaping Use    Vaping status: Never Used   Substance and Sexual Activity    Alcohol use: Not Currently     Comment: socially    Drug use: No    Sexual activity: Yes     Partners: Male     Social Drivers of Health     Food Insecurity: No Food Insecurity (10/25/2024)    Food Insecurity     Food Insecurity: Never true   Transportation Needs: No Transportation Needs (10/25/2024)    Transportation Needs     Lack of Transportation: No   Housing Stability: Low Risk  (10/25/2024)    Housing Stability     Housing Instability: No        Current Medications:  Current Facility-Administered Medications   Medication Dose Route Frequency    polyethylene glycol (PEG 3350) (Miralax) 17 g oral packet 17 g  17 g Oral Daily    sodium chloride 0.9 % IV bolus 500 mL  500 mL Intravenous Once    butalbital-acetaminophen-caffeine (Fioricet) -40 MG per tab 2 tablet  2 tablet Oral Q4H PRN    escitalopram (Lexapro) tab 10 mg  10 mg Oral Daily with breakfast    melatonin cap/tab 5 mg  5 mg Oral Nightly    [Held by provider] spironolactone (Aldactone) tab 200 mg  200 mg Oral Daily    polyethylene glycol (PEG 3350) (Miralax) 17 g oral packet 17 g  17 g Oral Daily PRN    sennosides (Senokot) tab 17.2 mg  17.2 mg Oral Nightly PRN    bisacodyl (Dulcolax) 10 MG rectal suppository 10 mg  10 mg Rectal Daily PRN    ondansetron  (Zofran) 4 MG/2ML injection 4 mg  4 mg Intravenous Q6H PRN    prochlorperazine (Compazine) 10 MG/2ML injection 5 mg  5 mg Intravenous Q8H PRN    acetaminophen (Tylenol Extra Strength) tab 500 mg  500 mg Oral Q4H PRN     Medications Prior to Admission   Medication Sig    escitalopram 10 MG Oral Tab Take 1 tablet (10 mg total) by mouth daily with breakfast.    spironolactone 100 MG Oral Tab Take 2 tablets (200 mg total) by mouth daily.    Levonorgestrel (MIRENA, 52 MG,) 20 MCG/DAY Intrauterine IUD 20 mcg (1 each total) by Intrauterine route once.    melatonin 5 MG Oral Cap Take 1 capsule (5 mg total) by mouth nightly.    semaglutide 2 MG/3ML Subcutaneous Solution Pen-injector Inject 0.25 mg into the skin once a week.    fluticasone propionate 50 MCG/ACT Nasal Suspension 2 sprays by Nasal route daily. (Patient not taking: Reported on 10/25/2024)    amoxicillin clavulanate 875-125 MG Oral Tab Take 1 tablet by mouth 2 (two) times daily for 7 days. (Patient not taking: Reported on 10/25/2024)    FLUTICASONE PROPIONATE 50 MCG/ACT Nasal Suspension SPRAY 2 SPRAYS INTO EACH NOSTRIL EVERY DAY (Patient not taking: Reported on 10/25/2024)    azithromycin 250 MG Oral Tab Take 2 tablets today by mouth, then 1 tablet daily. (Patient not taking: Reported on 10/25/2024)    Amoxicillin-Pot Clavulanate (AUGMENTIN) 875-125 MG Oral Tab Take 1 tablet by mouth 2 (two) times daily. (Patient not taking: Reported on 10/25/2024)    Albuterol Sulfate  (90 Base) MCG/ACT Inhalation Aero Soln Inhale 2 puffs into the lungs every 6 (six) hours as needed for Wheezing. (Patient not taking: Reported on 10/25/2024)       Allergies  Allergies[1]    Review of Systems:   Constitutional: + fever, chills  Eyes: Denies dry eyes, blurry vision, redness of the eyes, pain in the eyes  ENT: Denies dry mouth, loss of taste, sores in the mouth, or difficulty swallowing   Cardiovascular: Denies chest pain, palpitations   Respiratory: Denies shortness of  breath  Gastrointestinal: Denies changes in bowl or bladder function, nausea, vomiting, heartburn  Integumentary: Denies photosensitivity, rash or lesions, Raynaud's  Neurological: Denies any numbness or tingling   Hematologic/Lymphatic: Denies bleeding, alopecia, Raynaud's phenomena   MSK refer to HPI      Physical Exam:   Blood pressure 102/68, pulse 72, temperature 98.4 °F (36.9 °C), temperature source Oral, resp. rate 16, height 5' 7\" (1.702 m), weight 166 lb (75.3 kg), last menstrual period 10/02/2024, SpO2 97%, not currently breastfeeding.  GEN: AAOx3, NAD  HEENT: EOMI, PERRLA, no injection or icterus, oral mucosa moist, no oral lesions. No lymphadenopathy. No facial rash  CVS: RRR, no murmurs rubs or gallops. Equal 2+ distal pulses.   LUNGS: CTAB, no increased work of breathing  ABDOMEN:  soft NT/ND, +BS, no HSM  SKIN: No rashes or skin lesions. No nail findings  MSK:  TTP in right PIPs.  Able to make a fist but not tight  TTP in bilateral elbows  Full range of motion in shoulders and knees  NEURO: Cranial nerves II-XII intact grossly. 5/5 strength throughout in both upper and lower extremities, sensation intact.  PSYCH: normal mood      Results:     Laboratory Data:  Lab Results   Component Value Date    WBC 3.8 (L) 10/27/2024    HGB 11.7 (L) 10/27/2024    HCT 34.6 (L) 10/27/2024    .0 10/27/2024    CREATSERUM 0.60 10/28/2024    BUN <5 (L) 10/28/2024     10/28/2024    K 4.0 10/28/2024     10/28/2024    CO2 27.0 10/28/2024    GLU 88 10/28/2024    CA 8.4 (L) 10/28/2024    ALB 3.6 10/28/2024    ALKPHO 148 (H) 10/28/2024    TP 5.8 10/28/2024     (H) 10/28/2024     (H) 10/28/2024    PTT 32.9 10/26/2024    INR 1.05 10/26/2024    PTP 14.4 10/26/2024    LIP 90 12/30/2022    DDIMER 1.19 (H) 10/26/2024    ESRML 3 10/25/2024    CRP 1.50 (H) 10/25/2024    MG 1.9 10/28/2024    PHOS 3.0 10/27/2024    B12 1,415 (H) 10/26/2024         Imaging:  XR LUMBAR PUNCTURE DIAG, INCLD IMG  (CPT=62328)    Result Date: 10/28/2024  CONCLUSION:  1. Successful fluoroscopic guided lumbar puncture for CSF evaluation. 2. Approximately 11.5 mL of clear cerebral spinal fluid withdrawn into 4 separate sterile vials. 3. Patient tolerated the procedure without immediate complications     Dictated by (CST): Milad Hassan MD on 10/28/2024 at 3:29 PM     Finalized by (CST): Milad Hassan MD on 10/28/2024 at 3:37 PM               Impression:      Polyarthralgias likely reactive arthritis  -She presented with headaches, fevers, leukopenia, thrombocytopenia for the past 2 weeks.  White cells and platelets have normalized.  Now having elevated LFTs  -Symptoms do appear to be likely viral induced  -Infectious disease following, workup currently negative  -Negative HIV, hepatitis, EBV PCR  -Typically joint pain improves over time once viral disease has resolved.  Could consider prednisone.  We will continue to monitor if this is needed  - will order further blood work     Questionable viral syndrome  -Presenting with headaches, fevers, leukopenia, thrombocytopenia and transaminitis  -Platelets and white cells have normalized and she has been afebrile  -Infectious workup pending  -LP was done today  -Infectious disease following    Thank you for allowing me to participate in the care of your patient.    Amanda Walker MD  10/28/2024         [1]   Allergies  Allergen Reactions    Lactose Intolerance NAUSEA AND VOMITING and SWELLING

## 2024-10-29 NOTE — PROGRESS NOTES
[facility]  part of [facility] Infectious Disease Progress Note    Bee Luna Patient Status:  Inpatient    1990 MRN [id number]   Location [facility] 4W/SW/SE Attending Cayden Howell, DO   Hosp Day # 4 PCP London Michel MD     Subjective:  Pt with on going HA, but improved overall.     Objective:    Allergies:  Allergies[1]    Medications:    Current Facility-Administered Medications:     HYDROcodone-acetaminophen (Norco) 5-325 MG per tab 1 tablet, 1 tablet, Oral, Q6H PRN    ketorolac (Toradol) 30 MG/ML injection 30 mg, 30 mg, Intravenous, Q6H PRN    polyethylene glycol (PEG 3350) (Miralax) 17 g oral packet 17 g, 17 g, Oral, Daily    sodium chloride 0.9 % IV bolus 500 mL, 500 mL, Intravenous, Once    butalbital-acetaminophen-caffeine (Fioricet) -40 MG per tab 2 tablet, 2 tablet, Oral, Q4H PRN    escitalopram (Lexapro) tab 10 mg, 10 mg, Oral, Daily with breakfast    melatonin cap/tab 5 mg, 5 mg, Oral, Nightly    [Held by provider] spironolactone (Aldactone) tab 200 mg, 200 mg, Oral, Daily    polyethylene glycol (PEG 3350) (Miralax) 17 g oral packet 17 g, 17 g, Oral, Daily PRN    sennosides (Senokot) tab 17.2 mg, 17.2 mg, Oral, Nightly PRN    bisacodyl (Dulcolax) 10 MG rectal suppository 10 mg, 10 mg, Rectal, Daily PRN    ondansetron (Zofran) 4 MG/2ML injection 4 mg, 4 mg, Intravenous, Q6H PRN    prochlorperazine (Compazine) 10 MG/2ML injection 5 mg, 5 mg, Intravenous, Q8H PRN    acetaminophen (Tylenol Extra Strength) tab 500 mg, 500 mg, Oral, Q4H PRN    Physical Exam:  General: Alert, orientated x3.  Cooperative.  No apparent distress.  Vital Signs:  Blood pressure 92/59, pulse 72, temperature 98.4 °F (36.9 °C), temperature source Oral, resp. rate 18, height 170.2 cm (5' 7\"), weight 166 lb (75.3 kg), last menstrual period 10/02/2024, SpO2 96%, not currently breastfeeding.   Temp (24hrs), Av.5 °F (36.9 °C), Min:98 °F (36.7 °C), Max:98.8 °F (37.1  °C)      HEENT: Exam is unremarkable.  Without scleral icterus.  Mucous membranes are moist. PERRLA.  Oropharynx is clear.  Neck: No tenderness to palpitation.  Full range of motion to flexion and extension, lateral rotation and lateral flexion of cervical spine.  No JVD. Supple.   Lungs: Clear to auscultation bilaterally.  Cardiac: Regular rate and rhythm. No murmur.  Abdomen:  Soft, non-distended, non-tender, with no rebound or guarding.   Extremities:  No lower extremity edema noted.  Without clubbing or cyanosis.    Skin: Normal texture and turgor.  Neurologic: Cranial nerves are grossly intact.  Motor strength and sensory examination is grossly normal.  No focal neurologic deficit.    Labs:  Lab Results   Component Value Date    WBC 4.5 10/29/2024    WBC 4.5 10/29/2024    HGB 11.6 10/29/2024    HGB 11.6 10/29/2024    HCT 35.0 10/29/2024    HCT 35.0 10/29/2024    .0 10/29/2024    .0 10/29/2024    CREATSERUM 0.65 10/29/2024    BUN 7 10/29/2024     10/29/2024    K 4.2 10/29/2024     10/29/2024    CO2 28.0 10/29/2024    GLU 84 10/29/2024    CA 8.9 10/29/2024    ALB 3.8 10/29/2024    ALKPHO 157 10/29/2024    BILT 0.3 10/29/2024    TP 6.4 10/29/2024     10/29/2024     10/29/2024         Assessment/Plan:    1.  HA, fever, myalgia, transaminitis and leukopenia  -on going x 3 weeks, has been seen in ED  -s/p PO augmentin  -leukopenia resolved, afebrile >24hrs  -PCT is WNL  -flow cytometry in process  -pt with extensive travel history (including [place] for work)  -extensive ID workup in process, currently negative  -s/p LP on 10/28, preliminary results does not appear bacterial   -rheum on consult  -off abx  2.  Dispo  -follow up with ID in 1 week, can be video visit, will need repeat LFTs     If you have any questions or concerns please call [facility] Infectious Disease at [phone].     Peterson Quinones, APRN         [1]   Allergies  Allergen Reactions    Lactose  Intolerance NAUSEA AND VOMITING and SWELLING

## 2024-10-29 NOTE — PLAN OF CARE
Problem: Patient Centered Care  Goal: Patient preferences are identified and integrated in the patient's plan of care  Description: Interventions:    - Provide timely, complete, and accurate information to patient/family  - Incorporate patient and family knowledge, values, beliefs, and cultural backgrounds into the planning and delivery of care  - Encourage patient/family to participate in care and decision-making at the level they choose  - Honor patient and family perspectives and choices  10/28/2024 1954 by Ewelina Malave, RN  Outcome: Progressing     Patient denies new complaints. Lumbar Puncture done today patient remained on bedrest until 7 PM, puncture site covered with band aid, clean, dry, intact.  Headache managed with Fioricet. Patient has been tolerating regular diet, fall precautions in place.

## 2024-10-29 NOTE — DISCHARGE INSTRUCTIONS
Avoid alcohol, we suggest you have repeat liver function testing in 4-6 weeks to make sure things return to normal. You do have several tests that are pending at the time of discharge and you will need to discuss results with your primary care provider

## 2024-10-30 ENCOUNTER — HOSPITAL ENCOUNTER (EMERGENCY)
Facility: HOSPITAL | Age: 34
Discharge: HOME | End: 2024-10-30
Attending: STUDENT IN AN ORGANIZED HEALTH CARE EDUCATION/TRAINING PROGRAM
Payer: COMMERCIAL

## 2024-10-30 VITALS
DIASTOLIC BLOOD PRESSURE: 62 MMHG | TEMPERATURE: 98 F | RESPIRATION RATE: 19 BRPM | SYSTOLIC BLOOD PRESSURE: 93 MMHG | WEIGHT: 155 LBS | BODY MASS INDEX: 24 KG/M2 | OXYGEN SATURATION: 98 % | HEART RATE: 67 BPM

## 2024-10-30 DIAGNOSIS — G97.1 SPINAL HEADACHE: Primary | ICD-10-CM

## 2024-10-30 LAB
ALBUMIN SERPL-MCNC: 4 G/DL (ref 3.2–4.8)
ALBUMIN/GLOB SERPL: 1.4 {RATIO} (ref 1–2)
ALP LIVER SERPL-CCNC: 150 U/L
ALT SERPL-CCNC: 418 U/L
ANION GAP SERPL CALC-SCNC: 7 MMOL/L (ref 0–18)
AST SERPL-CCNC: 219 U/L (ref ?–34)
B-HCG UR QL: NEGATIVE
BASOPHILS # BLD AUTO: 0.02 X10(3) UL (ref 0–0.2)
BASOPHILS NFR BLD AUTO: 0.4 %
BILIRUB SERPL-MCNC: 0.3 MG/DL (ref 0.3–1.2)
BUN BLD-MCNC: 11 MG/DL (ref 9–23)
BUN/CREAT SERPL: 16.4 (ref 10–20)
CALCIUM BLD-MCNC: 8.8 MG/DL (ref 8.7–10.4)
CHLORIDE SERPL-SCNC: 106 MMOL/L (ref 98–112)
CO2 SERPL-SCNC: 27 MMOL/L (ref 21–32)
CREAT BLD-MCNC: 0.67 MG/DL
DEPRECATED RDW RBC AUTO: 42.2 FL (ref 35.1–46.3)
DSDNA IGG SERPL IA-ACNC: 1.2 IU/ML
EGFRCR SERPLBLD CKD-EPI 2021: 118 ML/MIN/1.73M2 (ref 60–?)
ENA AB SER QL IA: 0.2 UG/L
ENA AB SER QL IA: NEGATIVE
EOSINOPHIL # BLD AUTO: 0.05 X10(3) UL (ref 0–0.7)
EOSINOPHIL NFR BLD AUTO: 1.1 %
ERYTHROCYTE [DISTWIDTH] IN BLOOD BY AUTOMATED COUNT: 12.9 % (ref 11–15)
GLOBULIN PLAS-MCNC: 2.8 G/DL (ref 2–3.5)
GLUCOSE BLD-MCNC: 84 MG/DL (ref 70–99)
HCT VFR BLD AUTO: 36.9 %
HGB BLD-MCNC: 12.1 G/DL
IMM GRANULOCYTES # BLD AUTO: 0.02 X10(3) UL (ref 0–1)
IMM GRANULOCYTES NFR BLD: 0.4 %
LYMPHOCYTES # BLD AUTO: 1.51 X10(3) UL (ref 1–4)
LYMPHOCYTES NFR BLD AUTO: 33.6 %
MCH RBC QN AUTO: 29.2 PG (ref 26–34)
MCHC RBC AUTO-ENTMCNC: 32.8 G/DL (ref 31–37)
MCV RBC AUTO: 88.9 FL
MONOCYTES # BLD AUTO: 0.4 X10(3) UL (ref 0.1–1)
MONOCYTES NFR BLD AUTO: 8.9 %
NEUTROPHILS # BLD AUTO: 2.49 X10 (3) UL (ref 1.5–7.7)
NEUTROPHILS # BLD AUTO: 2.49 X10(3) UL (ref 1.5–7.7)
NEUTROPHILS NFR BLD AUTO: 55.6 %
OSMOLALITY SERPL CALC.SUM OF ELEC: 289 MOSM/KG (ref 275–295)
PARVO B19 IGG: 0.3 INDEX
PARVO B19 IGM: 0.2 INDEX
PLATELET # BLD AUTO: 369 10(3)UL (ref 150–450)
POTASSIUM SERPL-SCNC: 4.4 MMOL/L (ref 3.5–5.1)
PROT SERPL-MCNC: 6.8 G/DL (ref 5.7–8.2)
RBC # BLD AUTO: 4.15 X10(6)UL
SODIUM SERPL-SCNC: 140 MMOL/L (ref 136–145)
VDRL CSF: NON REACTIVE
WBC # BLD AUTO: 4.5 X10(3) UL (ref 4–11)
WNV IGG: NEGATIVE
WNV IGG: NEGATIVE
WNV IGM: NEGATIVE
WNV IGM: NEGATIVE

## 2024-10-30 PROCEDURE — 85025 COMPLETE CBC W/AUTO DIFF WBC: CPT | Performed by: STUDENT IN AN ORGANIZED HEALTH CARE EDUCATION/TRAINING PROGRAM

## 2024-10-30 PROCEDURE — 99284 EMERGENCY DEPT VISIT MOD MDM: CPT

## 2024-10-30 PROCEDURE — 81025 URINE PREGNANCY TEST: CPT

## 2024-10-30 PROCEDURE — 62273 INJECT EPIDURAL PATCH: CPT

## 2024-10-30 PROCEDURE — 80053 COMPREHEN METABOLIC PANEL: CPT | Performed by: STUDENT IN AN ORGANIZED HEALTH CARE EDUCATION/TRAINING PROGRAM

## 2024-10-30 PROCEDURE — 36415 COLL VENOUS BLD VENIPUNCTURE: CPT

## 2024-10-30 RX ORDER — PROCHLORPERAZINE EDISYLATE 5 MG/ML
10 INJECTION INTRAMUSCULAR; INTRAVENOUS ONCE
Status: DISCONTINUED | OUTPATIENT
Start: 2024-10-30 | End: 2024-10-30

## 2024-10-30 RX ORDER — DIPHENHYDRAMINE HYDROCHLORIDE 50 MG/ML
25 INJECTION INTRAMUSCULAR; INTRAVENOUS ONCE
Status: DISCONTINUED | OUTPATIENT
Start: 2024-10-30 | End: 2024-10-30

## 2024-10-30 RX ORDER — BUTALBITAL, ACETAMINOPHEN AND CAFFEINE 50; 325; 40 MG/1; MG/1; MG/1
1 TABLET ORAL ONCE
Status: COMPLETED | OUTPATIENT
Start: 2024-10-30 | End: 2024-10-30

## 2024-10-30 RX ORDER — KETOROLAC TROMETHAMINE 15 MG/ML
15 INJECTION, SOLUTION INTRAMUSCULAR; INTRAVENOUS ONCE
Status: DISCONTINUED | OUTPATIENT
Start: 2024-10-30 | End: 2024-10-30

## 2024-10-30 NOTE — PAYOR COMM NOTE
--------------  DISCHARGE REVIEW    Payor: [employer]   Subscriber #:  [id number]  Authorization Number: [id number]    Admit date: 10/25/24  Admit time:   8:15 PM  Discharge Date: 10/29/2024  1:29 PM     Admitting Physician: Kaia Allen MD  Attending Physician:  Nikki att. providers found  Primary Care Physician: Hal Ferguson MD          Discharge Summary Notes        Discharge Summary signed by Cayden Howell DO at 10/29/2024  2:02 PM       Author: Cayden Howell DO Specialty: HOSPITALIST Author Type: Physician    Filed: 10/29/2024  2:02 PM Date of Service: 10/29/2024  1:59 PM Status: Signed    : Cayden Howell DO (Physician)           General Medicine Discharge Summary     Patient ID:  Bee Luna  34 year old  1/16/1990    Admit date: 10/25/2024    Discharge date and time: 10/29/2024  1:29 PM     Attending Physician: Cayden Howell DO     Consults: IP CONSULT TO INFECTIOUS DISEASE  IP CONSULT TO HEMATOLOGY  IP CONSULT TO RHEUMATOLOGY    Primary Care Physician: London Michel MD     Reason for admission: Headache, muscle aches     Risk For Readmission: low     Discharge Diagnoses: Peripheral edema [R60.0]  Transaminitis [R74.01]  Leukopenia, unspecified type [D72.819]  See Additional Discharge Diagnoses in Hospital Course    Discharged Condition: good    Follow-up with labs/images appointments:   Patient has several lab tests pending at the time of discharge including extensive infectious and rheumatologic workup for cause of headache muscle aches and joint pains  Close follow-up with primary care provider and infectious disease provider recommended within 2 weeks  Patient also has elevation in LFTs likely as a result of viral process which needs to be repeated in 4 to 6 weeks to assure resolution      Exam  Gen: No acute distress  Pulm: Lungs clear, normal respiratory effort  CV: Heart with regular rate and rhythm  Abd: Abdomen soft,   EXT: no edema     HPI:   Per   Alejandro Luna is a 34 year old female with PMH sig for anxiety, prior lap bahman who has had several ER and clinic visits for fevers and mild pancytopenia.  Saw Heme on 10/23 and  reported multiple sx including fevfesr, HA, mylagias, chills, and dizzinesss.  Infectious work up so far neg.  Did have WBC 2 Hgb 13.9 Plts 121, ANC 1360,  Differential %'s normal, peripheral smear reviewed by pathology: mild neutropenia, mild lymphocytopenia, mild thrombocytopenia, neutrophils with reactive features such as cytoplasmic vacuoles, lymphocytes appear mature and unremarkable, plts slightly decreased, no platelet clumping or circulating blasts seen.   Was put on augmentin without improvement.  Flow cytometry sent, was trying to get into [facility] for earlier appointment.  Came into er due to persistent sx.  Also feels abx made her hands and feet and face swell althought exam not remarkable, no hives.  No other rashes.  Pt feels better after HA cocktail.  LFTs are also elevated.  Plan for heme and ID work up.       Dw patient in agreement.  Sx have been fluctutaing for over 1 week.  No other sick contacts.  No Cp or SOB.  Afebrile here. Repeat Wbc IS 4, PLTs overall stable.       Hospital Course:     Patient was admitted for expedited workup of pancytopenia muscle aches fevers ultimately infectious workup was negative including lumbar puncture several infectious serologies were still pending at the time of discharge close follow-up with primary care provider and infectious disease provider was recommended to follow-up on these pending tests.  Patient also has elevation in LFTs which I suspect to be postviral repeat LFTs are recommended in 4 to 6 weeks..   She was given brief course of Zofran and Fioricet for headache        Operative Procedures:      Imaging: XR LUMBAR PUNCTURE DIAG, INCLD IMG (CPT=62328)    Result Date: 10/28/2024  CONCLUSION:  1. Successful fluoroscopic guided lumbar puncture for CSF  evaluation. 2. Approximately 11.5 mL of clear cerebral spinal fluid withdrawn into 4 separate sterile vials. 3. Patient tolerated the procedure without immediate complications     Dictated by (CST): Milad Hassan MD on 10/28/2024 at 3:29 PM     Finalized by (CST): Milad Hassan MD on 10/28/2024 at 3:37 PM           Disposition: home    Activity: activity as tolerated  Diet: regular diet  Wound Care: none needed  Code Status: Full Code  O2: None    Home Medication Changes: See list below     Med list     Medication List        START taking these medications      butalbital-acetaminophen-caffeine -40 MG Tabs  Commonly known as: Fioricet  Take 2 tablets by mouth every 4 (four) hours as needed for Headaches.     ondansetron 4 MG Tbdp  Commonly known as: Zofran-ODT  Take 1 tablet (4 mg total) by mouth every 8 (eight) hours as needed for Nausea.  Notes to patient: Last given 10/29 at 07:41             CONTINUE taking these medications      escitalopram 10 MG Tabs  Commonly known as: Lexapro     melatonin 5 MG Caps     Mirena (52 MG) 20 MCG/DAY Iud  Generic drug: Levonorgestrel     semaglutide 2 MG/3ML Sopn  Commonly known as: Ozempic     spironolactone 100 MG Tabs  Commonly known as: Aldactone            STOP taking these medications      albuterol 108 (90 Base) MCG/ACT Aers  Commonly known as: Ventolin HFA     amoxicillin clavulanate 875-125 MG Tabs  Commonly known as: Augmentin     azithromycin 250 MG Tabs  Commonly known as: Zithromax     fluticasone propionate 50 MCG/ACT Susp  Commonly known as: Flonase               Where to Get Your Medications        These medications were sent to [facility]/pharmacy #[id number] - [place] - [address] [phone], [phone]  [address] [place] [address]      Phone: [phone]   butalbital-acetaminophen-caffeine -40 MG Tabs  ondansetron 4 MG Tbdp         FU   Follow-up Information       London Michel MD Follow up in 2 week(s).     Specialty: Internal Medicine  Contact information:  [address]  [address]  [facility] [address]  [phone]               Mian Aguirre MD. Schedule an appointment as soon as possible for a visit in 1 week(s).    Specialties: INFECTIOUS DISEASES, Internal Medicine  Why: Follow up appointment   As needed if testing is abnormal  Contact information:  [address]  [address]  [place] [address] [phone]                             DC instructions:      Other Discharge Instructions:         Avoid alcohol, we suggest you have repeat liver function testing in 4-6 weeks to make sure things return to normal. You do have several tests that are pending at the time of discharge and you will need to discuss results with your primary care provider         I reconciled current and discharge medications on day of discharge, discussed changes with patient and noted changes above.       Total Time Coordinating Care: 35 minutes    Patient had opportunity to ask questions and state understand and agree with therapeutic plan as outlined    Thank You,    Cayden Howell DO   Hospitalist with [facility]         Electronically signed by Cayden Howell DO on 10/29/2024  2:02 PM         REVIEWER COMMENTS

## 2024-10-31 LAB
CCP IGG SERPL-ACNC: 0.8 U/ML (ref 0–6.9)
CHIKUNGUNYA ANTIBODY, IGG: 0.09 IV
CHIKUNGUNYA ANTIBODY, IGG: 0.09 IV
CHIKUNGUNYA IGM AB: 0.41 IV
CHIKUNGUNYA IGM AB: 0.41 IV
EBV PCR REAL TIME: NEGATIVE

## 2024-10-31 NOTE — ED PROVIDER NOTES
Patient Seen in: [facility] Emergency Department      History     Chief Complaint   Patient presents with    Headache     Stated Complaint: s/p LP having headaches    Subjective:   HPI      34-year-old female presenting for evaluation of a headache.  She was recently admitted for leukopenia, on the course of this workup had lumbar puncture.  Following the lumbar puncture developed severe positional frontal headache and neck pain.  Some improvement with Fioricet but is limited on this due to abnormal LFTs.  She presents today due to uncontrolled pain, requesting epidural blood patch.No new numbness weakness or tingling.  No fever.    Objective:     Past Medical History:    Anemia    Anxiety    Lactose intolerance              Past Surgical History:   Procedure Laterality Date    Cholecystectomy  2013                Social History     Socioeconomic History    Marital status:     Number of children: 0   Occupational History    Occupation: investments   Tobacco Use    Smoking status: Some Days     Types: Cigarettes    Smokeless tobacco: Never    Tobacco comments:     Once in a while   Vaping Use    Vaping status: Never Used   Substance and Sexual Activity    Alcohol use: Not Currently     Comment: socially    Drug use: No    Sexual activity: Yes     Partners: Male     Social Drivers of Health     Food Insecurity: No Food Insecurity (10/25/2024)    Food Insecurity     Food Insecurity: Never true   Transportation Needs: No Transportation Needs (10/25/2024)    Transportation Needs     Lack of Transportation: No   Housing Stability: Low Risk  (10/25/2024)    Housing Stability     Housing Instability: No                  Physical Exam     ED Triage Vitals   BP 10/30/24 1953 105/58   Pulse 10/30/24 1953 83   Resp 10/30/24 1953 18   Temp 10/30/24 1953 98 °F (36.7 °C)   Temp src --    SpO2 10/30/24 1953 98 %   O2 Device 10/30/24 2030 None (Room air)       Current Vitals:   Vital Signs  BP: 91/60  Pulse:  69  Resp: 14  Temp: 98 °F (36.7 °C)  MAP (mmHg): 70    Oxygen Therapy  SpO2: 100 %  O2 Device: None (Room air)        Physical Exam  Constitutional: awake, alert, no sig distress  HENT: mmm, no lesions,  Neck: normal range of motion, no tenderness, supple.  Eyes: PERRL, EOMI, conjunctiva normal, no discharge. Sclera anicteric.  Cardiovascular: rr no murmur  Respiratory: Normal breath sounds, no respiratory distress, no wheezing, no chest tenderness.  GI: Bowel sounds normal, Soft, no tenderness, no masses, no pulsatile masses.  : No CVA tenderness.  Skin: Warm, dry, no erythema, no rash.  Musculoskeletal: Intact distal pulses, no edema, no tenderness, no cyanosis, no clubbing. Good range of motion in all major joints. No tenderness to palpation or major deformities noted. Back- No tenderness.  Neurologic: Alert & oriented x 3, normal motor function, normal sensory function, no focal deficits noted.  Psych: Calm, cooperative, nl affect        ED Course     Labs Reviewed   COMP METABOLIC PANEL (14) - Abnormal; Notable for the following components:       Result Value     (*)      (*)     Alkaline Phosphatase 150 (*)     All other components within normal limits   POCT PREGNANCY URINE - Normal   CBC WITH DIFFERENTIAL WITH PLATELET                   MDM      34Y female with history as documented above presenting for evaluation of post LP headache.  On arrival vitals are stable and reassuring.  Symptoms are positional, and otherwise are quite consistent with spinal headache.  She has been refractory to medical therapy with Fioricet.  Has had little response to migraine cocktail in the past.  Anesthesia called to the bedside to perform lumbar epidural blood patch.    Return precautions and follow-up instructions were discussed with patient who voiced understanding and agreement the plan.  All questions were answered to patient satisfaction.        Medical Decision Making      Disposition and Plan      Clinical Impression:  1. Spinal headache         Disposition:  Discharge  10/30/2024 10:05 pm    Follow-up:  Hal Ferguson MD  [address]  [address]  [place] [address]  [phone]    Follow up in 2 day(s)      [facility] Emergency Department  [address] [address]  [phone]  Follow up  As needed, If symptoms worsen          Medications Prescribed:  Current Discharge Medication List              Supplementary Documentation:

## 2024-10-31 NOTE — CHRONIC PAIN
[facility]  Report of Consultation    Bee Luna Patient Status:  Emergency    1990 MRN [id number]   Location [facility] EMERGENCY DEPARTMENT Attending Cayden Tanner*     PCP Hal Ferguson MD       Date of Consult:  2024    Reason for Consultation: Post dural puncture headache      History of Present Illness:  Bee Luna is a a(n) 34 year old female  with PMH sig for anxiety, prior lap bahman who has had several ER and clinic visits for fevers and mild pancytopenia. Patient was admitted for expedited workup of pancytopenia muscle aches fevers ultimately infectious workup was negative including lumbar puncture several infectious serologies were still pending at the time of discharge close follow-up with primary care provider and infectious disease provider was recommended to follow-up on these pending tests. Patient also has elevation in LFTs which I suspect to be postviral repeat LFTs are recommended in 4 to 6 weeks.  Patients LP was on the  and then developed a positional head with photophobia and nausea.After failing conservative therapy she went to the ER.  Denies fevers or any flu Symptoms.            History:  Past Medical History:    Anemia    Anxiety    Lactose intolerance     Past Surgical History:   Procedure Laterality Date    Cholecystectomy       Family History   Problem Relation Age of Onset    Other (Other) Mother         thyroid tumor      reports that she has been smoking cigarettes. She has never used smokeless tobacco. She reports that she does not currently use alcohol. She reports that she does not use drugs.    Allergies:  Allergies[1]    Medications:    Current Facility-Administered Medications:     sodium chloride 0.9 % IV bolus 1,000 mL, 1,000 mL, Intravenous, Once    butalbital-acetaminophen-caffeine (Fioricet) -40 MG per tab 1 tablet, 1 tablet, Oral, Once  Scheduled Meds:   sodium chloride  1,000 mL  Intravenous Once    butalbital-acetaminophen-caffeine  1 tablet Oral Once     Continuous Infusions:  PRN Meds:.    Review of Systems:  Pertinent items are noted in HPI.    Physical Exam:  Blood pressure 91/60, pulse 69, temperature 98 °F (36.7 °C), resp. rate 14, weight 155 lb (70.3 kg), last menstrual period 10/02/2024, SpO2 100%, not currently breastfeeding.  General: Alert and oriented x3, NAD, appears stated age, appropriate disposition and demeanor, answers questions appropriately appears to be comfortable in bed NAD  Head: normocephalic, atraumatic  Eyes: anicteric; no injection  Ears: no obvious deformities noted   Nose: externally grossly within normal limits, no unusual discharge or rhinorrhea   Throat: lips grossly within normal limits by visual exam externally  Neck: supple, trachea midline, no obvious JVD  Chest: S1, S2, RRR, no obvious visual deformity  Respiratory: CTAB  Abdomen: soft, non-tender, bowel sounds present,   Laboratory Data:  Lab Results   Component Value Date    WBC 4.5 10/30/2024    HGB 12.1 10/30/2024    HCT 36.9 10/30/2024    .0 10/30/2024    CREATSERUM 0.67 10/30/2024    BUN 11 10/30/2024     10/30/2024    K 4.4 10/30/2024     10/30/2024    CO2 27.0 10/30/2024    GLU 84 10/30/2024    CA 8.8 10/30/2024    ALB 4.0 10/30/2024    ALKPHO 150 10/30/2024    BILT 0.3 10/30/2024    TP 6.8 10/30/2024     10/30/2024     10/30/2024       Imaging:  Narrative   PROCEDURE: XR LUMBAR PUNCTURE (CPT=62270,91618)     COMPARISON: None.     INDICATIONS: Recurrent fevers, joint swelling , headaches.     TECHNIQUE: The patient was informed of the nature of the procedure, which included a discussion of the benefits and risks, including, but not limited to, low back pain bleeding, infection versus spinal headache.  Informed consent was obtained.  Patient  was prepped and draped in usual sterile fashion.  Following administration 1% lidocaine for local anesthesia, 20 gauge  spinal needle introduced in lumbar thecal sac from a paramedian approach under fluoroscopic guidance at the L3 level.  Approximately  11.5 cc of clear cerebral spinal fluid withdrawn into 4 separate sterile vials without adverse reaction.  Patient tolerated the procedure without immediate complications.     FLUOROSCOPY IMAGES OBTAINED:  1  FLUOROSCOPY TIME:  0.9 minutes  RADIATION DOSE (Dose Area Product):  896.8-uGy*m^2        FINDINGS:  LEVEL: L3  NEEDLE: 20 gauge.    FLUID OBTAINED: 11.5 cc.    COMPLICATIONS: None.  Estimated blood loss:  None.  Recommendations:  Supine bed rest with the head of the bed less than 30ø elevated for the next 5 hours (till 1800 hours )               Impression   CONCLUSION:  1. Successful fluoroscopic guided lumbar puncture for CSF evaluation.  2. Approximately 11.5 mL of clear cerebral spinal fluid withdrawn into 4 separate sterile vials.  3. Patient tolerated the procedure without immediate complications             Dictated by (CST): Milad Hassan MD on 10/28/2024 at 3:29 PM         Impression:  Patient Active Problem List   Diagnosis    Anxiety    Seasonal allergies    Dermatographic urticaria    Hx laparoscopic cholecystectomy    Papanicolaou smear of cervix with low grade squamous intraepithelial lesion (LGSIL)     (normal spontaneous vaginal delivery) ([facility])    Postpartum care and examination of lactating mother ([facility])    Pelvic relaxation due to cystocele, midline    IUD (intrauterine device) in place    Leukopenia, unspecified type    Thrombocytopenia ([facility])    Transaminitis    Peripheral edema    Anemia    Fever    Polyarthralgia       Postdural puncture headache    Recommendations:  Lumbar epidural blood patch    I have informed Bee Luna  of the risks of neuraxial anesthesia including, but not limited to: failure, headache, backache, spinal, unilateral/patchy block, difficulty breathing, infection, bleeding, nerve damage, paralysis, death. The patient  desires the proposed neuraxial anesthetic as planned.      Total time: 25mins    JERI REBOLLEDO MD  10/30/2024  [facility] [phone]       [1]   Allergies  Allergen Reactions    Lactose Intolerance NAUSEA AND VOMITING and SWELLING      constant

## 2024-10-31 NOTE — DISCHARGE INSTRUCTIONS
Follow-up care  Follow up with your healthcare provider, or as advised.   When to get medical advice  Call your provider right away if any of these occur:   Headache remains severe for more than a few hours after the procedure  Headache gets worse with sitting or standing  Vomiting repeatedly (unable to keep liquids down)  Numbness or tingling of the legs  Unable to pee  Bleeding or pain at the injection site  Confusion or trouble thinking  Fever 100.4°F (38°C) or whatever your provider told you to report based on your medical condition

## 2024-10-31 NOTE — ED INITIAL ASSESSMENT (HPI)
Patient complains of headache for the past three days, states she recently had an LP to evaluate viral symptoms

## 2024-10-31 NOTE — PROCEDURES
Patient: Bee Luna    : 1990              Date of procedure: 10/30/2024    Preoperative diagnosis:   1. Spinal headache        Postop diagnosis:  1. Spinal headache            Procedure:     Lumbar epidural Blood patch    Surgeon: Earl Gaona Jr., MD    Indications: 34 year old female with post dural puncture headache who failed conservative therapy      Operative procedure:   Patient was placed in the RLD pposition Patient was prepped and draped in normal sterile fashion.  Xylocaine 1% was instilled per 25-gauge needle into the skin and subcutaneous tissue at the level of L2/3.  An 18-gauge Touhy needle was then placed and advanced loss-of-resistance technique to the epidural space without difficulty.  There was no CSF paresthesia or blood noted.  Blood was then sterilely obtained from the right antecubital vein by the RN. After negative aspiration, 15ml of blood was  instilled without difficulty.  The needle was withdrawn.  A Band-Aid was applied.  The patient tolerated procedure well without complication was discharged home with instructions.             Electronically approved by:   Earl Gaona Jr., MD

## 2024-10-31 NOTE — ED QUICK NOTES
PT to Ed with spouse reporting here today as advised by doctor for blood patch due to headache and neck pain when standing up. Recently hospitalized 10/25/24 and completed lumbar puncture.

## 2024-11-01 LAB
DENGUE IGG: 2.23 ISR
DENGUE IGG: 2.23 ISR
DENGUE IGM: 1.28 ISR
DENGUE IGM: 1.28 ISR
HLA B27 SCREENING: NEGATIVE
HLA B27 SCREENING: NEGATIVE
LYME IGG CSF INTERP WB: NEGATIVE
LYME IGM CSF INTERP WB: NEGATIVE

## 2024-11-03 LAB — BACTERIA BLD CULT: POSITIVE

## 2024-11-08 LAB
WNV IGG CSF ENCEPH: 0 IV
WNV IGM CSF ENCEPH: 0.1 IV

## (undated) NOTE — ED AVS SNAPSHOT
Carly Alicia   MRN: [id number]    Department:  [facility] Emergency Department   Date of Visit:  3/12/2019           Disclosure     Insurance plans vary and the physician(s) referred by the ER may not be covered by your plan.  Please cont CARE PHYSICIAN AT ONCE OR RETURN IMMEDIATELY TO THE EMERGENCY DEPARTMENT. If you have been prescribed any medication(s), please fill your prescription right away and begin taking the medication(s) as directed.   If you believe that any of the medications

## (undated) NOTE — MR AVS SNAPSHOT
After Visit Summary   9/25/2019    Phyllis Clark    MRN: [id number]           Visit Information     Date & Time  9/25/2019  5:15 PM Provider  Faiza Harp, [address] [facility], [facility].  Phone [email or web address]                   DO YOU KNOW WHERE TO GO? Injury & illness are never convenient. If you are dealing with a   non-emergency, consider your options before heading to an ER.          SAME DAY  APPOINTMENTS  [place]

## (undated) NOTE — LETTER
Meri Bullion, [address]    1. I authorize the performance upon Meri Krzysztof  the following: Insertion Intrauterine Device    2.  I authorize Dr. Randy Bach MD (and whomever is designated as the doct Relationship to patient: ____________________________________________    Witness: _________________________________________ Date:___________     Physician Signature: _______________________________ Date:___________

## (undated) NOTE — LETTER
Date & Time: 3/13/2019, 1:28 AM  Patient: Carly Alicia  Encounter Provider(s):    Jcarlos Herring MD       To Whom It May Concern:    Genevieve Lopez was seen and treated in our department on 3/12/2019. She may return to work 3/14/2018.     If yo